# Patient Record
Sex: MALE | Race: WHITE | Employment: OTHER | URBAN - METROPOLITAN AREA
[De-identification: names, ages, dates, MRNs, and addresses within clinical notes are randomized per-mention and may not be internally consistent; named-entity substitution may affect disease eponyms.]

---

## 2020-10-14 ENCOUNTER — TRANSCRIBE ORDER (OUTPATIENT)
Dept: SCHEDULING | Age: 70
End: 2020-10-14

## 2020-10-14 DIAGNOSIS — M48.061 SPINAL STENOSIS OF LUMBAR REGION WITHOUT NEUROGENIC CLAUDICATION: Primary | ICD-10-CM

## 2021-12-20 ENCOUNTER — OFFICE VISIT (OUTPATIENT)
Dept: ORTHOPEDIC SURGERY | Age: 71
End: 2021-12-20
Payer: MEDICARE

## 2021-12-20 VITALS — HEIGHT: 66 IN | BODY MASS INDEX: 25.71 KG/M2 | WEIGHT: 160 LBS

## 2021-12-20 DIAGNOSIS — S46.211A TRAUMATIC PARTIAL TEAR OF BICEPS TENDON, RIGHT, INITIAL ENCOUNTER: ICD-10-CM

## 2021-12-20 DIAGNOSIS — M79.601 RIGHT ARM PAIN: ICD-10-CM

## 2021-12-20 DIAGNOSIS — M75.101 NONTRAUMATIC TEAR OF RIGHT ROTATOR CUFF, UNSPECIFIED TEAR EXTENT: Primary | ICD-10-CM

## 2021-12-20 DIAGNOSIS — M75.21 BICEPS TENDINITIS ON RIGHT: ICD-10-CM

## 2021-12-20 DIAGNOSIS — M75.101 NONTRAUMATIC TEAR OF RIGHT ROTATOR CUFF, UNSPECIFIED TEAR EXTENT: ICD-10-CM

## 2021-12-20 DIAGNOSIS — M75.41 SHOULDER IMPINGEMENT, RIGHT: ICD-10-CM

## 2021-12-20 DIAGNOSIS — M25.521 RIGHT ELBOW PAIN: ICD-10-CM

## 2021-12-20 DIAGNOSIS — M19.011 ARTHRITIS OF RIGHT GLENOHUMERAL JOINT: ICD-10-CM

## 2021-12-20 DIAGNOSIS — M25.511 ACUTE PAIN OF RIGHT SHOULDER: Primary | ICD-10-CM

## 2021-12-20 PROCEDURE — 3017F COLORECTAL CA SCREEN DOC REV: CPT | Performed by: ORTHOPAEDIC SURGERY

## 2021-12-20 PROCEDURE — G8427 DOCREV CUR MEDS BY ELIG CLIN: HCPCS | Performed by: ORTHOPAEDIC SURGERY

## 2021-12-20 PROCEDURE — G8419 CALC BMI OUT NRM PARAM NOF/U: HCPCS | Performed by: ORTHOPAEDIC SURGERY

## 2021-12-20 PROCEDURE — 99214 OFFICE O/P EST MOD 30 MIN: CPT | Performed by: ORTHOPAEDIC SURGERY

## 2021-12-20 PROCEDURE — G8510 SCR DEP NEG, NO PLAN REQD: HCPCS | Performed by: ORTHOPAEDIC SURGERY

## 2021-12-20 PROCEDURE — G8536 NO DOC ELDER MAL SCRN: HCPCS | Performed by: ORTHOPAEDIC SURGERY

## 2021-12-20 PROCEDURE — 1101F PT FALLS ASSESS-DOCD LE1/YR: CPT | Performed by: ORTHOPAEDIC SURGERY

## 2021-12-20 RX ORDER — ATORVASTATIN CALCIUM 80 MG/1
TABLET, FILM COATED ORAL
COMMUNITY

## 2021-12-20 RX ORDER — ASPIRIN 81 MG/1
TABLET ORAL
COMMUNITY

## 2021-12-20 RX ORDER — LEVOTHYROXINE SODIUM 112 UG/1
TABLET ORAL
COMMUNITY

## 2021-12-20 RX ORDER — GABAPENTIN 100 MG/1
CAPSULE ORAL
COMMUNITY
Start: 2021-08-30 | End: 2022-06-08

## 2021-12-20 NOTE — PROGRESS NOTES
Keyla Cummings (: 1950) is a 66 y.o. male, patient, here for evaluation of the following chief complaint(s):  Shoulder Pain (right) and Arm Pain (right)       HPI:    He began having increased right shoulder and arm pain on 2021 had an injury involving the roof. The patient reports that he thinks his pain is also exercise related. He describes his right shoulder and arm pain is moderate, dull, aching, and intermittent. His discomfort does make it difficult for him to go to sleep and does wake him up from sleep. He states that his pain level is essentially unchanged over the last several weeks. He reports that lifting and lying in bed makes pain worse and rest makes his pain better. The patient was not seen in the emergency room. He does report previous but unrelated surgery. Not on File    Current Outpatient Medications   Medication Sig    gabapentin (NEURONTIN) 100 mg capsule gabapentin 100 mg capsule   100mg daily as needed for pain    aspirin delayed-release 81 mg tablet aspirin 81 mg tablet,delayed release   Take 1 tablet every day by oral route.  atorvastatin (LIPITOR) 80 mg tablet atorvastatin 80 mg tablet   TAKE 1 TABLET EVERY DAY    levothyroxine (SYNTHROID) 112 mcg tablet levothyroxine 112 mcg tablet   TAKE 1 TABLET EVERY DAY    rivaroxaban (Xarelto) 2.5 mg tablet Xarelto 2.5 mg tablet   TAKE 1 TABLET BY MOUTH TWICE DAILY     No current facility-administered medications for this visit. History reviewed. No pertinent past medical history. History reviewed. No pertinent surgical history. History reviewed. No pertinent family history.      Social History     Socioeconomic History    Marital status:      Spouse name: Not on file    Number of children: Not on file    Years of education: Not on file    Highest education level: Not on file   Occupational History    Not on file   Tobacco Use    Smoking status: Never Smoker    Smokeless tobacco: Never Used Substance and Sexual Activity    Alcohol use: Not Currently    Drug use: Never    Sexual activity: Not on file   Other Topics Concern    Not on file   Social History Narrative    Not on file     Social Determinants of Health     Financial Resource Strain:     Difficulty of Paying Living Expenses: Not on file   Food Insecurity:     Worried About Running Out of Food in the Last Year: Not on file    [place] of Food in the Last Year: Not on file   Transportation Needs:     Lack of Transportation (Medical): Not on file    Lack of Transportation (Non-Medical): Not on file   Physical Activity:     Days of Exercise per Week: Not on file    Minutes of Exercise per Session: Not on file   Stress:     Feeling of Stress : Not on file   Social Connections:     Frequency of Communication with Friends and Family: Not on file    Frequency of Social Gatherings with Friends and Family: Not on file    Attends Tenri[employer] Services: Not on file    Active Member of [address] [place] [address] or Organizations: Not on file    Attends Club or Organization Meetings: Not on file    Marital Status: Not on file   Intimate Partner Violence:     Fear of Current or Ex-Partner: Not on file    Emotionally Abused: Not on file    Physically Abused: Not on file    Sexually Abused: Not on file   Housing Stability:     Unable to Pay for Housing in the Last Year: Not on file    Number of [place] in the Last Year: Not on file    Unstable Housing in the Last Year: Not on file       Review of Systems   All other systems reviewed and are negative. Vitals:  Ht 5' 6\" (1.676 m)   Wt 160 lb (72.6 kg)   BMI 25.82 kg/m²    Body mass index is 25.82 kg/m². Ortho Exam     The patient is well-developed and well-nourished. The patient presents today in alert and oriented x3 with a normal mood and affect. The patient stands with a normal weightbearing line and walks with a normal gait. Right shoulder: No shoulder girdle atrophy.   There is no soft tissue swelling, ecchymosis, abrasions, or lacerations. Active range of motion of the shoulder is limited to 130 degrees of forward flexion, 120 degrees of lateral abduction, and 70 degrees of external rotation. Internal rotation is to the SI joint and is painful. Passive range of motion is full with a painful impingement sign and painful Roberts sign. Rotator cuff strength is painful to evaluate and is a 4+/5 with forward flexion and lateral abduction. External rotation strength is maintained. There is no crepitation about the joint. Palpation of the [facility] joint does not reproduce discomfort and there is pain elicited with cross body abduction. Strength of the extremity is 5/5 strength at biceps/triceps/wrist extension and is comparable to the contralateral side. DTRs are intact at +2/4 and are symmetrical.  Cervical range of motion is full with no pain to palpation along the paraspinal musculature medial border of the scapula. Spurling's sign is negative. Right elbow, the patient sits with normal posture. They are tender to palpation over the distal biceps insertion. There is soft tissue swelling in the antecubital fossa. The patient has limited range of motion, and discomfort with resisted flexion and supination. The patient has a negative hook test. They have 5/5 strength distally, and are neurovascularly intact distally. There is no erythema, warmth or skin lesions present. ASSESSMENT/PLAN:      1. Acute pain of right shoulder  -     XR SHOULDER RT AP/LAT MIN 2 V; Future  2. Nontraumatic tear of right rotator cuff, unspecified tear extent  3. Arthritis of right glenohumeral joint  4. Right elbow pain  5. Traumatic partial tear of biceps tendon, right, initial encounter  6. Biceps tendinitis on right  7. Right arm pain  8.  Shoulder impingement, right    XR Results (most recent):  Results from Appointment encounter on 12/20/21    XR ELBOW RT AP/LAT    Narrative  2 view x-rays of the right elbow show no evidence of a fracture or dislocation. There is no evidence of degenerative disease. XR SHOULDER RT AP/LAT MIN 2 V    Narrative  Three-view x-rays of the right shoulder show no evidence of a fracture or dislocation. There is a very small osteophyte of the inferior humeral head and there is some evidence of impingement. There is evidence also of a previous distal clavicle resection. Below is the assessment and plan developed based on review of pertinent history, physical exam, labs, studies, and medications. We discussed the patient's ongoing right shoulder pain and right elbow and arm pain. His signs, symptoms, physical exam, description of his pain, and x-rays for shoulder are consistent with a rotator cuff tear, a humeral head osteophyte, and impingement. There is evidence of a previous distal clavicle resection. His signs, symptoms, physical exam, description of his pain, and x-rays for his right elbow are consistent with biceps tendinitis versus a partial distal biceps tendon tear. The possible treatment options were discussed with the patient and because of the duration of his increased pain, no improvement with multiple modalities of conservative management including an at-home exercise program, his physical exam, description of his pain, x-rays, and his inability to complete daily living activities without significant discomfort we elected to obtain an MRI of both his right shoulder and right elbow to further evaluate the severity of his rotator cuff tear and impingement as well as his biceps tendon tear/tendinitis. The MRI images and results will be used in preoperative planning if and likely when surgical intervention is necessary. The risks and benefits of the MRIs were discussed in detail with the patient and he would like to proceed. We will schedule these at his convenience.   I will see him back after his MRIs are complete to discuss the images, results, and further treatment options. In the interim, I did encourage him to ice when possible, modify his activity level based on his right shoulder and arm pain, and use anti-inflammatory medication when necessary. The patient will also work on range of motion, strengthening, and stretching exercises with an at-home exercise program as pain tolerates. I will see him back as noted above after his right shoulder and right elbow MRIs are complete. **We will obtain 2 MRIs for more information to determine the best treatment plan moving forward and help us prepare for surgical intervention if necessary. **    Return in about 3 weeks (around 1/10/2022) for After his right shoulder and right elbow MRI are complete. An electronic signature was used to authenticate this note.   -- Blaine Romero MD

## 2022-01-11 ENCOUNTER — HOSPITAL ENCOUNTER (OUTPATIENT)
Dept: MRI IMAGING | Age: 72
Discharge: HOME | End: 2022-01-11
Attending: ORTHOPAEDIC SURGERY
Payer: MEDICARE

## 2022-01-11 VITALS — WEIGHT: 160 LBS | BODY MASS INDEX: 25.82 KG/M2

## 2022-01-11 DIAGNOSIS — M75.101 NONTRAUMATIC TEAR OF RIGHT ROTATOR CUFF, UNSPECIFIED TEAR EXTENT: ICD-10-CM

## 2022-01-11 DIAGNOSIS — M75.21 BICEPS TENDINITIS ON RIGHT: ICD-10-CM

## 2022-01-11 PROCEDURE — 73221 MRI JOINT UPR EXTREM W/O DYE: CPT

## 2022-01-11 PROCEDURE — A9575 INJ GADOTERATE MEGLUMI 0.1ML: HCPCS | Performed by: RADIOLOGY

## 2022-01-11 PROCEDURE — 74011250636 HC RX REV CODE- 250/636: Performed by: RADIOLOGY

## 2022-01-11 PROCEDURE — 73223 MRI JOINT UPR EXTR W/O&W/DYE: CPT

## 2022-01-11 RX ORDER — GADOTERATE MEGLUMINE 376.9 MG/ML
14 INJECTION INTRAVENOUS
Status: COMPLETED | OUTPATIENT
Start: 2022-01-11 | End: 2022-01-11

## 2022-01-11 RX ADMIN — GADOTERATE MEGLUMINE 14 ML: 376.9 INJECTION INTRAVENOUS at 14:18

## 2022-02-01 ENCOUNTER — OFFICE VISIT (OUTPATIENT)
Dept: ORTHOPEDIC SURGERY | Age: 72
End: 2022-02-01
Payer: MEDICARE

## 2022-02-01 DIAGNOSIS — M75.21 BICEPS TENDINITIS ON RIGHT: ICD-10-CM

## 2022-02-01 DIAGNOSIS — M25.511 CHRONIC RIGHT SHOULDER PAIN: ICD-10-CM

## 2022-02-01 DIAGNOSIS — S46.111D RUPTURE LONG HEAD BICEPS TENDON, RIGHT, SUBSEQUENT ENCOUNTER: ICD-10-CM

## 2022-02-01 DIAGNOSIS — M75.41 SHOULDER IMPINGEMENT, RIGHT: ICD-10-CM

## 2022-02-01 DIAGNOSIS — G89.29 CHRONIC RIGHT SHOULDER PAIN: ICD-10-CM

## 2022-02-01 DIAGNOSIS — M24.821 RIGHT ELBOW JOINT CREPITUS: ICD-10-CM

## 2022-02-01 DIAGNOSIS — M19.011 ARTHRITIS OF RIGHT GLENOHUMERAL JOINT: ICD-10-CM

## 2022-02-01 DIAGNOSIS — M25.511 ACUTE PAIN OF RIGHT SHOULDER: ICD-10-CM

## 2022-02-01 DIAGNOSIS — M75.111 NONTRAUMATIC INCOMPLETE TEAR OF RIGHT ROTATOR CUFF: ICD-10-CM

## 2022-02-01 DIAGNOSIS — M25.521 RIGHT ELBOW PAIN: Primary | ICD-10-CM

## 2022-02-01 PROCEDURE — G8432 DEP SCR NOT DOC, RNG: HCPCS | Performed by: ORTHOPAEDIC SURGERY

## 2022-02-01 PROCEDURE — G8419 CALC BMI OUT NRM PARAM NOF/U: HCPCS | Performed by: ORTHOPAEDIC SURGERY

## 2022-02-01 PROCEDURE — 99214 OFFICE O/P EST MOD 30 MIN: CPT | Performed by: ORTHOPAEDIC SURGERY

## 2022-02-01 PROCEDURE — 3017F COLORECTAL CA SCREEN DOC REV: CPT | Performed by: ORTHOPAEDIC SURGERY

## 2022-02-01 PROCEDURE — 1101F PT FALLS ASSESS-DOCD LE1/YR: CPT | Performed by: ORTHOPAEDIC SURGERY

## 2022-02-01 PROCEDURE — G8536 NO DOC ELDER MAL SCRN: HCPCS | Performed by: ORTHOPAEDIC SURGERY

## 2022-02-01 PROCEDURE — G8427 DOCREV CUR MEDS BY ELIG CLIN: HCPCS | Performed by: ORTHOPAEDIC SURGERY

## 2022-02-01 NOTE — PROGRESS NOTES
Brooklyn Wilson (: 1950) is a 66 y.o. male, patient, here for evaluation of the following chief complaint(s):  No chief complaint on file. HPI:    He was last seen for his right shoulder and right elbow pain on 2021. Since then, the patient did have an MRI performed on his right shoulder and right elbow on 2022. The patient states that his pain levels the same as was his last visit. He rates the severity of his right shoulder and elbow pain is a 4 out of 10. He describes his pain is dull, aching, and intermittent. His right shoulder and elbow pain does make it difficult for him to go to sleep and does wake him up from sleep. The patient has been experiencing some weakness in his right shoulder and elbow. He reports taking no medication for his discomfort. Right elbow MRI results:  Full thickness tear of the distal biceps tendon with approximately 0.5 cm retraction (11-12). Associated marked distal biceps tendinosis, moderate grade distal biceps muscle strain, and moderate bicipital-radial bursitis. Brachialis and triceps tendons are intact. Partial tear at the humeral attachment of the ulnar collateral ligament (9-16) with redemonstrated 5 mm mature osseous fragment in this location (10-16), likely a chronic avulsive fragment. Region of ill-defined edema signal and swelling in the subcutaneous fat posterior to the olecranon (11-13), may reflect bursitis. Moderate grade partial tears at the common flexor and common extensor origins. Right shoulder MRI results: Moderate subscapularis tendinosis and high-grade partial-thickness tears. Complete tear of the intra-articular long head of the biceps tendon. Retraction into the bicipital groove. Moderate supraspinatus tendinosis and shallow partial-thickness tears. Mild infraspinatus tendinosis. IGHL sprain versus adhesive capsulitis. Acromioclavicular osteophytes may cause subacromial impingement.     Not on File    Current Outpatient Medications   Medication Sig    aspirin delayed-release 81 mg tablet aspirin 81 mg tablet,delayed release   Take 1 tablet every day by oral route.  atorvastatin (LIPITOR) 80 mg tablet atorvastatin 80 mg tablet   TAKE 1 TABLET EVERY DAY    levothyroxine (SYNTHROID) 112 mcg tablet levothyroxine 112 mcg tablet   TAKE 1 TABLET EVERY DAY    gabapentin (NEURONTIN) 100 mg capsule gabapentin 100 mg capsule   100mg daily as needed for pain    rivaroxaban (Xarelto) 2.5 mg tablet Xarelto 2.5 mg tablet   TAKE 1 TABLET BY MOUTH TWICE DAILY     No current facility-administered medications for this visit. History reviewed. No pertinent past medical history. History reviewed. No pertinent surgical history. History reviewed. No pertinent family history. Social History     Socioeconomic History    Marital status:      Spouse name: Not on file    Number of children: Not on file    Years of education: Not on file    Highest education level: Not on file   Occupational History    Not on file   Tobacco Use    Smoking status: Never Smoker    Smokeless tobacco: Never Used   Substance and Sexual Activity    Alcohol use: Not Currently    Drug use: Never    Sexual activity: Not on file   Other Topics Concern    Not on file   Social History Narrative    Not on file     Social Determinants of Health     Financial Resource Strain:     Difficulty of Paying Living Expenses: Not on file   Food Insecurity:     Worried About Running Out of Food in the Last Year: Not on file    [place] of Food in the Last Year: Not on file   Transportation Needs:     Lack of Transportation (Medical): Not on file    Lack of Transportation (Non-Medical):  Not on file   Physical Activity:     Days of Exercise per Week: Not on file    Minutes of Exercise per Session: Not on file   Stress:     Feeling of Stress : Not on file   Social Connections:     Frequency of Communication with Friends and Family: Not on file  Frequency of Social Gatherings with Friends and Family: Not on file    Attends Anabaptist Services: Not on file    Active Member of Clubs or Organizations: Not on file    Attends Club or Organization Meetings: Not on file    Marital Status: Not on file   Intimate Partner Violence:     Fear of Current or Ex-Partner: Not on file    Emotionally Abused: Not on file    Physically Abused: Not on file    Sexually Abused: Not on file   Housing Stability:     Unable to Pay for Housing in the Last Year: Not on file    Number of [place] in the Last Year: Not on file    Unstable Housing in the Last Year: Not on file       Review of Systems   All other systems reviewed and are negative. Vitals: There were no vitals taken for this visit. There is no height or weight on file to calculate BMI. Ortho Exam     The patient is well-developed and well-nourished. The patient presents today in alert and oriented x3 with a normal mood and affect. The patient stands with a normal weightbearing line and walks with a normal gait.     Right shoulder: No shoulder girdle atrophy. There is no soft tissue swelling, ecchymosis, abrasions, or lacerations. Active range of motion of the shoulder is limited to 130 degrees of forward flexion, 120 degrees of lateral abduction, and 70 degrees of external rotation. Internal rotation is to the SI joint and is painful. Passive range of motion is full with a painful impingement sign and painful Roberts sign. Rotator cuff strength is painful to evaluate and is a 4+/5 with forward flexion and lateral abduction. External rotation strength is maintained. There is no crepitation about the joint. Palpation of the [facility] joint does not reproduce discomfort and there is pain elicited with cross body abduction. Strength of the extremity is 5/5 strength at biceps/triceps/wrist extension and is comparable to the contralateral side.   DTRs are intact at +2/4 and are symmetrical. Cervical range of motion is full with no pain to palpation along the paraspinal musculature medial border of the scapula. Spurling's sign is negative.     Right elbow, the patient sits with normal posture. They are tender to palpation over the distal biceps insertion. There is soft tissue swelling in the antecubital fossa. The patient has limited range of motion, and discomfort with resisted flexion and supination. The patient has a negative hook test. They have 5/5 strength distally, and are neurovascularly intact distally. There is no erythema, warmth or skin lesions present. ASSESSMENT/PLAN:      1. Right elbow pain  2. Right elbow joint crepitus  3. Acute pain of right shoulder  4. Chronic right shoulder pain  5. Arthritis of right glenohumeral joint  -     REFERRAL TO ORTHOPEDIC SURGERY  6. Shoulder impingement, right  -     REFERRAL TO ORTHOPEDIC SURGERY  7. Nontraumatic incomplete tear of right rotator cuff  -     REFERRAL TO ORTHOPEDIC SURGERY  8. Biceps tendinitis on right  9. Rupture long head biceps tendon, right, subsequent encounter       Below is the assessment and plan developed based on review of pertinent history, physical exam, labs, studies, and medications. We discussed the patient's ongoing right shoulder and elbow pain. We reviewed his MRI images and results today. His right elbow MRI images and results were consistent with a full thickness tear of the distal biceps tendon with approximately 0.5 cm retraction (11-12). Associated marked distal biceps tendinosis, moderate grade distal biceps muscle strain, and moderate bicipital-radial bursitis. Brachialis and triceps tendons are intact. Partial tear at the humeral attachment of the ulnar collateral ligament (9-16) with redemonstrated 5 mm mature osseous fragment in this location (10-16), likely a chronic avulsive fragment.   Region of ill-defined edema signal and swelling in the subcutaneous fat posterior to the olecranon (11-13), may reflect bursitis. Moderate grade partial tears at the common flexor and common extensor origins. His right shoulder MRI results were consistent with moderate subscapularis tendinosis and high-grade partial-thickness tears. Complete tear of the intra-articular long head of the biceps tendon. Retraction into the bicipital groove. Moderate supraspinatus tendinosis and shallow partial-thickness tears. Mild infraspinatus tendinosis. IGHL sprain versus adhesive capsulitis. Acromioclavicular osteophytes may cause subacromial impingement. The possible treatment options were discussed with the patient and we elected to treat his elbow pain conservatively at this point with rest, ice, activity modification, and anti-inflammatory medication. The possible treatment options were discussed with the patient for his right shoulder and because of the duration of his increased pain, no improvement with multiple modalities of conservative management including an at-home exercise program, his physical exam, description of his pain, past x-rays, MRI images and results, and his inability to complete daily living activities without significant discomfort we both decided that surgical intervention was the best treatment plan. The risks and benefits of a right shoulder arthroscopic exam with rotator cuff repair, acromioplasty, distal clavicle resection, and extensive debridement were discussed in detail with the patient and he would like to proceed. We will schedule this at his convenience. In the interim, I did encourage him to ice when possible, modify his activity level based on his right shoulder and right elbow pain, and use anti-inflammatory medication when necessary. The patient will also work on range of motion, strengthening, and stretching exercises with an at-home exercise program as pain tolerates. I will see him back in the near future on the day of his right shoulder surgery.     Return for In the office as needed or on the day of his right shoulder surgery. An electronic signature was used to authenticate this note.   -- Guera Gaston MD

## 2022-02-07 DIAGNOSIS — M19.011 ARTHRITIS OF RIGHT GLENOHUMERAL JOINT: Primary | ICD-10-CM

## 2022-02-07 RX ORDER — OXYCODONE AND ACETAMINOPHEN 5; 325 MG/1; MG/1
1 TABLET ORAL
Qty: 40 TABLET | Refills: 0 | Status: SHIPPED | OUTPATIENT
Start: 2022-02-07 | End: 2022-02-14

## 2022-02-17 NOTE — PROGRESS NOTES
Aly Martínez (: 1950) is a 66 y.o. male, patient, here for evaluation of the following chief complaint(s):  Surgical Follow-up and Shoulder Pain (right)       HPI:    He is now 8 days status post right shoulder arthroscopic exam with rotator cuff repair, acromioplasty, and extensive debridement. His surgery was performed on 2022. The patient rates the severity of his postoperative right shoulder pain as a 2 out of 10. He describes his pain is aching and intermittent. His postoperative right shoulder pain does make it difficult for him to go to sleep and does wake him up from sleep. The patient reports taking no medication for his discomfort. Of note, he did present today NOT wearing his immobilizer as instructed. Not on File    Current Outpatient Medications   Medication Sig    aspirin delayed-release 81 mg tablet aspirin 81 mg tablet,delayed release   Take 1 tablet every day by oral route.  atorvastatin (LIPITOR) 80 mg tablet atorvastatin 80 mg tablet   TAKE 1 TABLET EVERY DAY    levothyroxine (SYNTHROID) 112 mcg tablet levothyroxine 112 mcg tablet   TAKE 1 TABLET EVERY DAY    gabapentin (NEURONTIN) 100 mg capsule gabapentin 100 mg capsule   100mg daily as needed for pain    rivaroxaban (Xarelto) 2.5 mg tablet Xarelto 2.5 mg tablet   TAKE 1 TABLET BY MOUTH TWICE DAILY     No current facility-administered medications for this visit. History reviewed. No pertinent past medical history. History reviewed. No pertinent surgical history. History reviewed. No pertinent family history.      Social History     Socioeconomic History    Marital status:      Spouse name: Not on file    Number of children: Not on file    Years of education: Not on file    Highest education level: Not on file   Occupational History    Not on file   Tobacco Use    Smoking status: Never Smoker    Smokeless tobacco: Never Used   Substance and Sexual Activity    Alcohol use: Not Currently    Drug use: Never    Sexual activity: Not on file   Other Topics Concern    Not on file   Social History Narrative    Not on file     Social Determinants of Health     Financial Resource Strain:     Difficulty of Paying Living Expenses: Not on file   Food Insecurity:     Worried About Running Out of Food in the Last Year: Not on file    [place] of Food in the Last Year: Not on file   Transportation Needs:     Lack of Transportation (Medical): Not on file    Lack of Transportation (Non-Medical): Not on file   Physical Activity:     Days of Exercise per Week: Not on file    Minutes of Exercise per Session: Not on file   Stress:     Feeling of Stress : Not on file   Social Connections:     Frequency of Communication with Friends and Family: Not on file    Frequency of Social Gatherings with Friends and Family: Not on file    Attends Mosque Services: Not on file    Active Member of [address] [place] [address] [employer] or Organizations: Not on file    Attends Club or Organization Meetings: Not on file    Marital Status: Not on file   Intimate Partner Violence:     Fear of Current or Ex-Partner: Not on file    Emotionally Abused: Not on file    Physically Abused: Not on file    Sexually Abused: Not on file   Housing Stability:     Unable to Pay for Housing in the Last Year: Not on file    Number of [place] in the Last Year: Not on file    Unstable Housing in the Last Year: Not on file       Review of Systems   All other systems reviewed and are negative. Vitals:  Ht 5' 6\" (1.676 m)   Wt 168 lb (76.2 kg)   BMI 27.12 kg/m²    Body mass index is 27.12 kg/m². Ortho Exam     The patient is well-developed and well-nourished. The patient presents today in alert and oriented x3 with a normal mood and affect. The patient stands with a normal weightbearing line and walks with a normal gait. Right shoulder wounds are clean and neurovascularly intact. There is some ecchymosis but no erythema.   His stitches were removed and his incisions are healing nicely with no sign of irritation or infection and look normal.  He does have good early elbow and wrist range of motion. His shoulder range of motion and strength were not tested today. His sensations are intact and his pulses are 2+. His skin is healing nicely. ASSESSMENT/PLAN:      1. Pain of right shoulder region  2. Status post shoulder surgery       Below is the assessment and plan developed based on review of pertinent history, physical exam, labs, studies, and medications. We discussed the patient's recent right shoulder arthroscopic exam with rotator cuff repair, acromioplasty, and extensive debridement. His surgery was performed on 2/9/2022. He is now 8 days status post surgical intervention. The patient is progressing nicely in the early stages of his recovery and having the appropriate amount of expected postoperative discomfort at this time. We did remove the patient stitches today in the office without complication. His incisions are healing nicely with no sign of irritation or infection and look normal.  He does have good early elbow range of motion. His shoulder range of motion and strength were not tested today. The patient will continue the postoperative protocol by remaining in his postoperative immobilizer as instructed. He did present today not wearing his immobilizer which is not advised and he will start wearing his immobilizer again as instructed. We did remove his abduction pillow today. He will work on passive only range of motion exercises with an at-home exercise program as pain tolerates. He is to avoid any active range of motion at this time. He is also to avoid any lifting with his right upper extremity. I did encourage him to ice when possible, modify his activity level based on his postoperative right shoulder and arm pain, and use anti-inflammatory medication when necessary.   I will see him back in 2 weeks for reevaluation and further discussion of the postoperative protocol    Return in about 2 weeks (around 3/4/2022) for Reevaluation and further discussion of the postop protocol. An electronic signature was used to authenticate this note.   -- Trip Rivera MD

## 2022-02-18 ENCOUNTER — OFFICE VISIT (OUTPATIENT)
Dept: ORTHOPEDIC SURGERY | Age: 72
End: 2022-02-18
Payer: MEDICARE

## 2022-02-18 VITALS — WEIGHT: 168 LBS | HEIGHT: 66 IN | BODY MASS INDEX: 27 KG/M2

## 2022-02-18 DIAGNOSIS — Z98.890 STATUS POST SHOULDER SURGERY: ICD-10-CM

## 2022-02-18 DIAGNOSIS — M25.511 PAIN OF RIGHT SHOULDER REGION: Primary | ICD-10-CM

## 2022-02-18 PROCEDURE — 99024 POSTOP FOLLOW-UP VISIT: CPT | Performed by: ORTHOPAEDIC SURGERY

## 2022-03-11 ENCOUNTER — OFFICE VISIT (OUTPATIENT)
Dept: ORTHOPEDIC SURGERY | Age: 72
End: 2022-03-11
Payer: MEDICARE

## 2022-03-11 DIAGNOSIS — Z98.890 STATUS POST SHOULDER SURGERY: ICD-10-CM

## 2022-03-11 DIAGNOSIS — M25.511 PAIN OF RIGHT SHOULDER REGION: Primary | ICD-10-CM

## 2022-03-11 PROCEDURE — 99024 POSTOP FOLLOW-UP VISIT: CPT | Performed by: ORTHOPAEDIC SURGERY

## 2022-03-11 NOTE — PROGRESS NOTES
Ludivina Leigh (: 1950) is a 66 y.o. male, patient, here for evaluation of the following chief complaint(s):  Surgical Follow-up and Shoulder Pain (right)       HPI:    He is now just over 4 weeks status post right shoulder arthroscopic exam with rotator cuff repair, acromioplasty, and extensive debridement. His surgery was performed on 2022. He was last seen on 2022. The patient states that his pain has improved since his last visit and surgical procedure. He gives no detail or description of his discomfort. The patient has been working on passive range of motion exercises with an at-home exercise program.  He has been taking medication for his pain as needed. The patient has been wearing his postoperative immobilizer as instructed. Not on File    Current Outpatient Medications   Medication Sig    aspirin delayed-release 81 mg tablet aspirin 81 mg tablet,delayed release   Take 1 tablet every day by oral route.  atorvastatin (LIPITOR) 80 mg tablet atorvastatin 80 mg tablet   TAKE 1 TABLET EVERY DAY    levothyroxine (SYNTHROID) 112 mcg tablet levothyroxine 112 mcg tablet   TAKE 1 TABLET EVERY DAY    gabapentin (NEURONTIN) 100 mg capsule gabapentin 100 mg capsule   100mg daily as needed for pain    rivaroxaban (Xarelto) 2.5 mg tablet Xarelto 2.5 mg tablet   TAKE 1 TABLET BY MOUTH TWICE DAILY     No current facility-administered medications for this visit. History reviewed. No pertinent past medical history. History reviewed. No pertinent surgical history. History reviewed. No pertinent family history.      Social History     Socioeconomic History    Marital status:      Spouse name: Not on file    Number of children: Not on file    Years of education: Not on file    Highest education level: Not on file   Occupational History    Not on file   Tobacco Use    Smoking status: Never Smoker    Smokeless tobacco: Never Used   Substance and Sexual Activity    Alcohol use: Not Currently    Drug use: Never    Sexual activity: Not on file   Other Topics Concern    Not on file   Social History Narrative    Not on file     Social Determinants of Health     Financial Resource Strain:     Difficulty of Paying Living Expenses: Not on file   Food Insecurity:     Worried About Running Out of Food in the Last Year: Not on file    [place] of Food in the Last Year: Not on file   Transportation Needs:     Lack of Transportation (Medical): Not on file    Lack of Transportation (Non-Medical): Not on file   Physical Activity:     Days of Exercise per Week: Not on file    Minutes of Exercise per Session: Not on file   Stress:     Feeling of Stress : Not on file   Social Connections:     Frequency of Communication with Friends and Family: Not on file    Frequency of Social Gatherings with Friends and Family: Not on file    Attends Synagogue Services: Not on file    Active Member of [address] [place] [address] or Organizations: Not on file    Attends Club or Organization Meetings: Not on file    Marital Status: Not on file   Intimate Partner Violence:     Fear of Current or Ex-Partner: Not on file    Emotionally Abused: Not on file    Physically Abused: Not on file    Sexually Abused: Not on file   Housing Stability:     Unable to Pay for Housing in the Last Year: Not on file    Number of [place] in the Last Year: Not on file    Unstable Housing in the Last Year: Not on file       Review of Systems   All other systems reviewed and are negative. Vitals: There were no vitals taken for this visit. There is no height or weight on file to calculate BMI. Ortho Exam     The patient is well-developed and well-nourished. The patient presents today in alert and oriented x3 with a normal mood and affect. The patient stands with a normal weightbearing line and walks with a normal gait. Right shoulder wounds are clean and dry neurovascular intact. There is no ecchymosis or erythema. His incisions are well-healed with no sign of irritation or infection and look normal.  He does have full elbow and wrist range of motion. His passive shoulder range of motion continues to improve nicely. He has approximately 150 degrees of passive forward flexion and 100 degrees of passive abduction. Strength was again not tested today. His sensations are intact and his pulses are 2+. His skin is well-healed. He has active range of motion to 120 degrees of forward flexion 90 degrees of abduction despite instructions to avoid active range of motion. ASSESSMENT/PLAN:      1. Pain of right shoulder region  2. Status post shoulder surgery  -     REFERRAL TO PHYSICAL THERAPY       Below is the assessment and plan developed based on review of pertinent history, physical exam, labs, studies, and medications. **At 5 weeks postop, the patient will start attending formal physical therapy. **    We discussed the patient's right shoulder arthroscopic exam with rotator cuff repair, acromioplasty, and extensive debridement. His surgery was performed on 2/9/2022. He is now just over 4 weeks status post surgical intervention. The patient continues to progress nicely in his recovery. His pain is intermittent and well controlled. His passive range of motion continues to improve. His strength was again not tested today. The patient will continue the postoperative protocol by weaning out of his postoperative immobilizer as instructed. He will continue to work on passive only range of motion exercises until he is 5 weeks status post surgical intervention. At 5 weeks postop, the patient will start working on active and active assist range of motion, strengthening, and stretching exercises at both formal physical therapy and with an at-home exercise program as pain tolerates. Until that time, he is to avoid any active range of motion.   He will continue to ice when possible, modify his activity level based on his postoperative right shoulder pain, and use anti-inflammatory medication when necessary. I will see him back in 3 weeks for reevaluation and further discussion of the postoperative protocol. Return in about 3 weeks (around 4/1/2022) for Reevaluation and further discussion of the postop protocol. An electronic signature was used to authenticate this note.   -- Jarett Finley MD

## 2022-04-11 ENCOUNTER — OFFICE VISIT (OUTPATIENT)
Dept: ORTHOPEDIC SURGERY | Age: 72
End: 2022-04-11
Payer: MEDICARE

## 2022-04-11 VITALS — BODY MASS INDEX: 25.71 KG/M2 | WEIGHT: 160 LBS | HEIGHT: 66 IN

## 2022-04-11 DIAGNOSIS — M25.511 PAIN OF RIGHT SHOULDER REGION: Primary | ICD-10-CM

## 2022-04-11 DIAGNOSIS — Z98.890 STATUS POST SHOULDER SURGERY: ICD-10-CM

## 2022-04-11 PROCEDURE — 99024 POSTOP FOLLOW-UP VISIT: CPT | Performed by: ORTHOPAEDIC SURGERY

## 2022-04-11 NOTE — PROGRESS NOTES
Susanne Christian (: 1950) is a 66 y.o. male, patient, here for evaluation of the following chief complaint(s):  Surgical Follow-up and Shoulder Pain (right)       HPI:    He is now approaching 9 weeks status post right shoulder arthroscopic exam with rotator cuff repair, acromioplasty, and extensive debridement.  His surgery was performed on 2022. He was last seen on 3/14/2022. The patient states that his pain is gotten slightly worse since his last visit. He does report that he tweaked his shoulder and felt increased discomfort a couple weeks ago. He describes his postoperative right shoulder pain as stabbing, aching, and intermittent. He reports taking no medication for his discomfort. Not on File    Current Outpatient Medications   Medication Sig    aspirin delayed-release 81 mg tablet aspirin 81 mg tablet,delayed release   Take 1 tablet every day by oral route.  atorvastatin (LIPITOR) 80 mg tablet atorvastatin 80 mg tablet   TAKE 1 TABLET EVERY DAY    levothyroxine (SYNTHROID) 112 mcg tablet levothyroxine 112 mcg tablet   TAKE 1 TABLET EVERY DAY    gabapentin (NEURONTIN) 100 mg capsule gabapentin 100 mg capsule   100mg daily as needed for pain    rivaroxaban (Xarelto) 2.5 mg tablet Xarelto 2.5 mg tablet   TAKE 1 TABLET BY MOUTH TWICE DAILY     No current facility-administered medications for this visit. History reviewed. No pertinent past medical history. History reviewed. No pertinent surgical history. History reviewed. No pertinent family history.      Social History     Socioeconomic History    Marital status:      Spouse name: Not on file    Number of children: Not on file    Years of education: Not on file    Highest education level: Not on file   Occupational History    Not on file   Tobacco Use    Smoking status: Never Smoker    Smokeless tobacco: Never Used   Substance and Sexual Activity    Alcohol use: Not Currently    Drug use: Never    Sexual activity: Not on file   Other Topics Concern    Not on file   Social History Narrative    Not on file     Social Determinants of Health     Financial Resource Strain:     Difficulty of Paying Living Expenses: Not on file   Food Insecurity:     Worried About Running Out of Food in the Last Year: Not on file    [place] of Food in the Last Year: Not on file   Transportation Needs:     Lack of Transportation (Medical): Not on file    Lack of Transportation (Non-Medical): Not on file   Physical Activity:     Days of Exercise per Week: Not on file    Minutes of Exercise per Session: Not on file   Stress:     Feeling of Stress : Not on file   Social Connections:     Frequency of Communication with Friends and Family: Not on file    Frequency of Social Gatherings with Friends and Family: Not on file    Attends Nondenominational Services: Not on file    Active Member of [address] [place] [address] [employer] or Organizations: Not on file    Attends Club or Organization Meetings: Not on file    Marital Status: Not on file   Intimate Partner Violence:     Fear of Current or Ex-Partner: Not on file    Emotionally Abused: Not on file    Physically Abused: Not on file    Sexually Abused: Not on file   Housing Stability:     Unable to Pay for Housing in the Last Year: Not on file    Number of [place] in the Last Year: Not on file    Unstable Housing in the Last Year: Not on file       Review of Systems   All other systems reviewed and are negative. Vitals:  Ht 5' 6\" (1.676 m)   Wt 160 lb (72.6 kg)   BMI 25.82 kg/m²    Body mass index is 25.82 kg/m². Ortho Exam     The patient is well-developed and well-nourished. The patient presents today in alert and oriented x3 with a normal mood and affect. The patient stands with a normal weightbearing line and walks with a normal gait. Right shoulder wounds are clean dry neurovascular intact. There is no ecchymosis or erythema.   His incisions are well-healed with no sign of irritation or infection and look normal.  He does have full elbow and wrist range of motion. The patient has well-maintained shoulder range of motion but does have discomfort over his infraspinatus. He has excellent strength. His sensations are intact his pulses are 2+. His skin is well-healed. ASSESSMENT/PLAN:      1. Pain of right shoulder region  2. Status post shoulder surgery       Below is the assessment and plan developed based on review of pertinent history, physical exam, labs, studies, and medications. **The patient was referred to formal physical therapy. **    We discussed the patient's right shoulder arthroscopic exam with rotator cuff repair, acromioplasty, and extensive debridement.  His surgery was performed on 2/9/2022. He is now approaching 9 weeks status post surgical intervention. The patient continues to progress nicely in his recovery. He does report a recent strain and does have some increased tenderness over his infraspinatus which we will continue to monitor. His range of motion and strength are both well-maintained. The patient will continue the postoperative protocol by working on range of motion, strengthening, and stretching exercises with both formal physical therapy and with an at-home exercise program as pain tolerates. He may continue to slowly increase activities as tolerated and as discussed today in the office. I did encourage him to ice when possible and modify his activity level based on his right shoulder pain. I will see him back in 4 weeks for reevaluation if he continues to have tenderness over his infraspinatus however, if his pain has improved and is well-maintained I will see him back on an as-needed basis. Return in about 4 weeks (around 5/9/2022), or if symptoms worsen or fail to improve. An electronic signature was used to authenticate this note.   -- Juhi Noble MD

## 2022-05-13 ENCOUNTER — OFFICE VISIT (OUTPATIENT)
Dept: ORTHOPEDIC SURGERY | Age: 72
End: 2022-05-13
Payer: MEDICARE

## 2022-05-13 VITALS — WEIGHT: 160 LBS | HEIGHT: 66 IN | BODY MASS INDEX: 25.71 KG/M2

## 2022-05-13 DIAGNOSIS — Z98.890 STATUS POST SHOULDER SURGERY: ICD-10-CM

## 2022-05-13 DIAGNOSIS — M25.511 PAIN OF RIGHT SHOULDER REGION: ICD-10-CM

## 2022-05-13 DIAGNOSIS — M54.12 CERVICAL RADICULOPATHY: Primary | ICD-10-CM

## 2022-05-13 DIAGNOSIS — M50.30 DDD (DEGENERATIVE DISC DISEASE), CERVICAL: ICD-10-CM

## 2022-05-13 PROCEDURE — 99213 OFFICE O/P EST LOW 20 MIN: CPT | Performed by: ORTHOPAEDIC SURGERY

## 2022-05-13 PROCEDURE — G8536 NO DOC ELDER MAL SCRN: HCPCS | Performed by: ORTHOPAEDIC SURGERY

## 2022-05-13 PROCEDURE — 1101F PT FALLS ASSESS-DOCD LE1/YR: CPT | Performed by: ORTHOPAEDIC SURGERY

## 2022-05-13 PROCEDURE — G8419 CALC BMI OUT NRM PARAM NOF/U: HCPCS | Performed by: ORTHOPAEDIC SURGERY

## 2022-05-13 PROCEDURE — G8427 DOCREV CUR MEDS BY ELIG CLIN: HCPCS | Performed by: ORTHOPAEDIC SURGERY

## 2022-05-13 PROCEDURE — G8432 DEP SCR NOT DOC, RNG: HCPCS | Performed by: ORTHOPAEDIC SURGERY

## 2022-05-13 PROCEDURE — 3017F COLORECTAL CA SCREEN DOC REV: CPT | Performed by: ORTHOPAEDIC SURGERY

## 2022-05-13 NOTE — PROGRESS NOTES
Selina Mcmahon (: 1950) is a 66 y.o. male, patient, here for evaluation of the following chief complaint(s):  Surgical Follow-up (right shoulder sx (22)) and Hand Pain (right)       HPI:    He is now approximately 13-1/2 weeks status post right shoulder arthroscopic exam with rotator cuff repair, acromioplasty, and extensive debridement.  His surgery was performed on 2022. The patient was last seen on 2022. The patient states that his pain level has improved since his last visit and surgical procedure. He rates the severity of his postoperative right shoulder pain is a 3 out of 10. The patient does report some increased arm pain and numbness and tingling down into his right hand. He states that this has happened since his last visit. He describes his discomfort as dull, stabbing, and intermittent. The patient has been taking anti-inflammatory and narcotic pain medication for his postoperative discomfort as needed. No Known Allergies    Current Outpatient Medications   Medication Sig    aspirin delayed-release 81 mg tablet aspirin 81 mg tablet,delayed release   Take 1 tablet every day by oral route.  atorvastatin (LIPITOR) 80 mg tablet atorvastatin 80 mg tablet   TAKE 1 TABLET EVERY DAY    levothyroxine (SYNTHROID) 112 mcg tablet levothyroxine 112 mcg tablet   TAKE 1 TABLET EVERY DAY    gabapentin (NEURONTIN) 100 mg capsule gabapentin 100 mg capsule   100mg daily as needed for pain    rivaroxaban (Xarelto) 2.5 mg tablet Xarelto 2.5 mg tablet   TAKE 1 TABLET BY MOUTH TWICE DAILY     No current facility-administered medications for this visit. History reviewed. No pertinent past medical history. History reviewed. No pertinent surgical history. History reviewed. No pertinent family history.      Social History     Socioeconomic History    Marital status:      Spouse name: Not on file    Number of children: Not on file    Years of education: Not on file    Highest education level: Not on file   Occupational History    Not on file   Tobacco Use    Smoking status: Never Smoker    Smokeless tobacco: Never Used   Substance and Sexual Activity    Alcohol use: Not Currently    Drug use: Never    Sexual activity: Not on file   Other Topics Concern    Not on file   Social History Narrative    Not on file     Social Determinants of Health     Financial Resource Strain:     Difficulty of Paying Living Expenses: Not on file   Food Insecurity:     Worried About Running Out of Food in the Last Year: Not on file    [place] of Food in the Last Year: Not on file   Transportation Needs:     Lack of Transportation (Medical): Not on file    Lack of Transportation (Non-Medical): Not on file   Physical Activity:     Days of Exercise per Week: Not on file    Minutes of Exercise per Session: Not on file   Stress:     Feeling of Stress : Not on file   Social Connections:     Frequency of Communication with Friends and Family: Not on file    Frequency of Social Gatherings with Friends and Family: Not on file    Attends [employer] Services: Not on file    Active Member of [address] [place] [address] or Organizations: Not on file    Attends Club or Organization Meetings: Not on file    Marital Status: Not on file   Intimate Partner Violence:     Fear of Current or Ex-Partner: Not on file    Emotionally Abused: Not on file    Physically Abused: Not on file    Sexually Abused: Not on file   Housing Stability:     Unable to Pay for Housing in the Last Year: Not on file    Number of [place] in the Last Year: Not on file    Unstable Housing in the Last Year: Not on file       Review of Systems   All other systems reviewed and are negative. Vitals:  Ht 5' 6\" (1.676 m)   Wt 160 lb (72.6 kg)   BMI 25.82 kg/m²    Body mass index is 25.82 kg/m². Ortho Exam     The patient is well-developed and well-nourished.   The patient presents today in alert and oriented x3 with a normal mood and affect. The patient stands with a normal weightbearing line and walks with a normal gait. Right shoulder wounds are clean and dry neurovascular intact. There is no ecchymosis or erythema. His incisions are well-healed with no sign of irritation or infection and look normal.  He does have full elbow and wrist range of motion. He has essentially regained full shoulder range of motion. His strength is improving nicely. There is still weakness noted compared to normal.  His sensations are intact his pulses are 2+. His skin is normal.  The patient does have some numbness and tingling down into his right hand. Neck has tenderness over the paraspinous musculature. There is diffuse decreased range of motion in all planes. His strength is decreased compared to normal.  There is a positive Spurling sign. There is pain radiating down into his right shoulder and this is causing numbness in his hand and fingers. His sensations are intact his pulses are 2+. His skin is normal.    ASSESSMENT/PLAN:      1. Cervical radiculopathy  -     XR SPINE CERV PA LAT ODONT 3 V MAX; Future  -     CT SPINE CERV WO CONT; Future  -     REFERRAL TO PHYSICAL THERAPY  2. DDD (degenerative disc disease), cervical  -     REFERRAL TO PHYSICAL THERAPY  3. Pain of right shoulder region  -     REFERRAL TO PHYSICAL THERAPY  4. Status post shoulder surgery  -     REFERRAL TO PHYSICAL THERAPY     XR Results (most recent):  Results from Appointment encounter on 05/13/22    XR SPINE CERV PA LAT ODONT 3 V MAX    Narrative  2 view x-rays of his cervical spine shows end-stage degenerative disc disease at C4-5 C6 5 6 and C6-7. There is complete loss of his cervical lordosis and posterior cervical osteophytes at each level. Below is the assessment and plan developed based on review of pertinent history, physical exam, labs, studies, and medications.     **The patient will continue attending formal physical therapy for his postoperative right shoulder pain. He was referred to formal physical therapy today for his cervical spine. **    We discussed the patient's right shoulder arthroscopic exam with rotator cuff repair, acromioplasty, and extensive debridement.  His surgery was performed on 2/9/2022. He is now approximately 13-1/2 weeks status postsurgical intervention. The patient continues to progress nicely in his recovery. His pain is intermittent and well controlled. He has essentially regained full range of motion. His strength continues to improve. The patient will continue the postoperative protocol by working on range of motion, strengthening, and stretching with both formal physical therapy and with an at-home exercise program as pain tolerates. He may continue to increase activities as tolerated and as discussed today in the office. He has essentially no restrictions at this time. I did encourage him to ice when possible, modify his activity level based on his postoperative right shoulder pain, and use anti-inflammatory medication when necessary. I will see him back on an as-needed basis for his right shoulder pain. We also discussed the patient's neck pain and numbness into his right hand and fingers. His signs, symptoms, physical exam, description of his pain, and x-rays are consistent with cervical radiculopathy and multilevel degenerative disc disease at C4-C5 C5-C6 C6-C7. The possible treatment options were discussed with the patient and because of the duration of his increased pain, no improvement with multiple modalities of conservative management, his physical exam, description of his pain and numbness in his fingers, x-rays, and his inability to complete daily living activities without significant discomfort we elected to obtain a CT scan of his cervical spine. We are obtaining a CT scan because the patient does have a pacemaker.   The CT scan images and results will help us determine the most appropriate treatment plan moving forward. The risks and benefits of the CT scan were discussed in detail with the patient and he would like to proceed. The hospital staff will schedule this at his convenience. After his CT scan is complete, the patient will follow-up with Dr. Alonzo Chauhan and his team for further evaluation of his cervical spine. We will refer to their treatment plan in regards to his neck. I will see him back as noted above for his right shoulder pain on an as-needed basis. **We will obtain a CT scan for more information to determine the best treatment plan moving forward and help us prepare for surgical intervention if necessary. **    Return for Follow-up with Dr. Alonzo Chauhan and his team after his cervical spine CT scan is complete. An electronic signature was used to authenticate this note.   -- Laura Shabazz MD

## 2022-05-26 ENCOUNTER — TELEPHONE (OUTPATIENT)
Dept: ORTHOPEDIC SURGERY | Age: 72
End: 2022-05-26

## 2022-06-03 ENCOUNTER — OFFICE VISIT (OUTPATIENT)
Dept: ORTHOPEDIC SURGERY | Age: 72
End: 2022-06-03
Payer: MEDICARE

## 2022-06-03 VITALS — HEIGHT: 66 IN | WEIGHT: 160 LBS | BODY MASS INDEX: 25.71 KG/M2

## 2022-06-03 DIAGNOSIS — M25.511 PAIN OF RIGHT SHOULDER REGION: ICD-10-CM

## 2022-06-03 DIAGNOSIS — M48.02 CERVICAL STENOSIS OF SPINAL CANAL: ICD-10-CM

## 2022-06-03 DIAGNOSIS — M50.90 CERVICAL DISC DISEASE: ICD-10-CM

## 2022-06-03 DIAGNOSIS — M54.12 CERVICAL RADICULOPATHY: Primary | ICD-10-CM

## 2022-06-03 DIAGNOSIS — M50.30 DDD (DEGENERATIVE DISC DISEASE), CERVICAL: ICD-10-CM

## 2022-06-03 PROCEDURE — 99214 OFFICE O/P EST MOD 30 MIN: CPT | Performed by: ORTHOPAEDIC SURGERY

## 2022-06-03 PROCEDURE — 1123F ACP DISCUSS/DSCN MKR DOCD: CPT | Performed by: ORTHOPAEDIC SURGERY

## 2022-06-03 PROCEDURE — 1101F PT FALLS ASSESS-DOCD LE1/YR: CPT | Performed by: ORTHOPAEDIC SURGERY

## 2022-06-03 PROCEDURE — G8427 DOCREV CUR MEDS BY ELIG CLIN: HCPCS | Performed by: ORTHOPAEDIC SURGERY

## 2022-06-03 PROCEDURE — G8432 DEP SCR NOT DOC, RNG: HCPCS | Performed by: ORTHOPAEDIC SURGERY

## 2022-06-03 PROCEDURE — G8419 CALC BMI OUT NRM PARAM NOF/U: HCPCS | Performed by: ORTHOPAEDIC SURGERY

## 2022-06-03 PROCEDURE — 3017F COLORECTAL CA SCREEN DOC REV: CPT | Performed by: ORTHOPAEDIC SURGERY

## 2022-06-03 PROCEDURE — G8536 NO DOC ELDER MAL SCRN: HCPCS | Performed by: ORTHOPAEDIC SURGERY

## 2022-06-03 NOTE — LETTER
6/3/2022    Patient: David Wesley   YOB: 1950   Date of Visit: 6/3/2022     Maria Esther Adamson MD  [address] [facility] [address]  Via Fax: [phone]    Dear Maria Esther Adamson MD,      Thank you for referring Mr. Roby Moran to [facility] for evaluation. My notes for this consultation are attached. If you have questions, please do not hesitate to call me. I look forward to following your patient along with you.       Sincerely,    Les Murray MD

## 2022-06-03 NOTE — PATIENT INSTRUCTIONS
Neck: Exercises  Introduction  Here are some examples of exercises for you to try. The exercises may be suggested for a condition or for rehabilitation. Start each exercise slowly. Ease off the exercises if you start to have pain. You will be told when to start these exercises and which ones will work best for you. How to do the exercises  Neck stretch    1. This stretch works best if you keep your shoulder down as you lean away from it. To help you remember to do this, start by relaxing your shoulders and lightly holding on to your thighs or your chair. 2. Tilt your head toward your shoulder and hold for 15 to 30 seconds. Let the weight of your head stretch your muscles. 3. If you would like a little added stretch, use your hand to gently and steadily pull your head toward your shoulder. For example, keeping your right shoulder down, lean your head to the left. 4. Repeat 2 to 4 times toward each shoulder. Diagonal neck stretch    1. Turn your head slightly toward the direction you will be stretching, and tilt your head diagonally toward your chest and hold for 15 to 30 seconds. 2. If you would like a little added stretch, use your hand to gently and steadily pull your head forward on the diagonal.  3. Repeat 2 to 4 times toward each side. Dorsal glide stretch    The dorsal glide stretches the back of the neck. If you feel pain, do not glide so far back. Some people find this exercise easier to do while lying on their backs with an ice pack on the neck. 1. Sit or stand tall and look straight ahead. 2. Slowly tuck your chin as you glide your head backward over your body  3. Hold for a count of 6, and then relax for up to 10 seconds. 4. Repeat 8 to 12 times. Chest and shoulder stretch    1. Sit or stand tall and glide your head backward as in the dorsal glide stretch. 2. Raise both arms so that your hands are next to your ears.   3. Take a deep breath, and as you breathe out, lower your elbows down and behind your back. You will feel your shoulder blades slide down and together, and at the same time you will feel a stretch across your chest and the front of your shoulders. 4. Hold for about 6 seconds, and then relax for up to 10 seconds. 5. Repeat 8 to 12 times. Strengthening: Hands on head    1. Move your head backward, forward, and side to side against gentle pressure from your hands, holding each position for about 6 seconds. 2. Repeat 8 to 12 times. Follow-up care is a key part of your treatment and safety. Be sure to make and go to all appointments, and call your doctor if you are having problems. It's also a good idea to know your test results and keep a list of the medicines you take. Where can you learn more? Go to http://www.chambers.com/  Enter P975 in the search box to learn more about \"Neck: Exercises. \"  Current as of: July 1, 2021               Content Version: 13.2  © 2006-2022 [employer]. Care instructions adapted under license by [employer] (which disclaims liability or warranty for this information). If you have questions about a medical condition or this instruction, always ask your healthcare professional. [address] any warranty or liability for your use of this information.

## 2022-06-03 NOTE — PROGRESS NOTES
Marjorie Johnson (: 1950) is a 66 y.o. male, patient, here for evaluation of the following chief complaint(s):  Neck Pain (Cervical CT Scan @[facility] 22)       ASSESSMENT/PLAN:    Below is the assessment and plan developed based on review of pertinent history, physical exam, labs, studies, and medications. The patient has worsening neck pain as well as weakness in the upper extremities particular on the right-hand side. He has multilevel cervical spondylosis. She also has stenosis at C4-5 C5-6 and C6-7. He is having worsening signs of myelopathy. We discussed treatment options. I think is a candidate for an ECF from C4-C7. Risk and benefits were discussed with her. The risks and benefits were discussed at length with the patient and the patient has elected to proceed. Indications for surgery include failed conservative treatment. Alternative treatments, risks and the perioperative course were discussed with the patient. All questions were answered. The risks and benefits of the procedure were explained. Benefits include definitive diagnosis, relief of pain, elimination of deformity and improved function. Risks of surgery including bleeding, infection, weakness, numbness, CSF leak, failure to improve symptoms, exacerbation of medical co-morbidities and even death were discussed with the patient. 1. Cervical radiculopathy  -     XR SPINE CERV PA LAT ODONT 3 V MAX; Future  2. Cervical disc disease  3. Cervical stenosis of spinal canal      No follow-ups on file. SUBJECTIVE/OBJECTIVE:  Marjorie Johnson (: 1950) is a 66 y.o. male. Pain Assessment  2022   Location of Pain Neck; Shoulder;Hand   Location Modifiers Right   Severity of Pain 3   Quality of Pain Dull; Sharp   Frequency of Pain Intermittent   Relieving Factors NSAID   Result of Injury -        He comes today for chronic neck pain and right arm weakness.   He has had this for the last couple months with increasing pain with activities. He is lost dexterity particularly on the right hand. He also has weakness with  strength. He is dropping things. He is also having increasing pain with any type of activity than normal light activities. Imaging:    XR Results (most recent):  Results from Appointment encounter on 06/03/22    XR SPINE CERV PA LAT ODONT 3 V MAX    Narrative  AP and lateral and flexion-extension views were reviewed. He has multilevel cervical spondylosis which is rather severe at C4-5 C5-6 and C6-7. Anterolisthesis C4-5 and C5-6. No other fracture or lytic lesions. MRI Results (most recent):    I independently reviewed his CT scan. He has multilevel cervical spondylosis particularly at C4-5, C5-6 and C6-7. He has near stenosis at C4-5 and C5-6 moderate stenosis C6-7. No Known Allergies    Current Outpatient Medications   Medication Sig    aspirin delayed-release 81 mg tablet aspirin 81 mg tablet,delayed release   Take 1 tablet every day by oral route.  atorvastatin (LIPITOR) 80 mg tablet atorvastatin 80 mg tablet   TAKE 1 TABLET EVERY DAY    levothyroxine (SYNTHROID) 112 mcg tablet levothyroxine 112 mcg tablet   TAKE 1 TABLET EVERY DAY    gabapentin (NEURONTIN) 100 mg capsule gabapentin 100 mg capsule   100mg daily as needed for pain    rivaroxaban (Xarelto) 2.5 mg tablet Xarelto 2.5 mg tablet   TAKE 1 TABLET BY MOUTH TWICE DAILY     No current facility-administered medications for this visit. History reviewed. No pertinent past medical history. History reviewed. No pertinent surgical history. History reviewed. No pertinent family history. Social History     Tobacco Use    Smoking status: Never Smoker    Smokeless tobacco: Never Used   Substance Use Topics    Alcohol use: Not Currently    Drug use: Never        Review of Systems       Vitals:  Ht 5' 6\" (1.676 m)   Wt 160 lb (72.6 kg)   BMI 25.82 kg/m²    Body mass index is 25.82 kg/m².     Ortho Exam Alert and [place]  x 3    Normal gait and station; normal posture    No assistive devices today. Lumbar spine:  Examination of the lumbar spine demonstrates no tenderness on palpation, no pain, no swelling or edema, with normal lumbar range of motion. Thoracic spine: Examination of the thoracic spine demonstrates no tenderness on palpation, no pain, no swelling or edema. Normal sensation and range of motion. Cervical spine:     Examination of the cervical spine demonstrates on inspection: No abnormal cutaneous markings. Mild straightening cervical ptosis. No shoulder asymmetry. .    On palpation: Noted tenderness along the right posterior scapular posterior shoulder blade region. Range of motion: Good range of motion maintained with no guarding. Motor examination:  Right deltoid 5/5,  left deltoid 5/5, right bicep 5/5, left bicep 5/5, right wrist extensor 5/5, left wrist extensor 5/5, right biceps 5/5, left triceps 5/5, right intrinsics 5/5, left intrinsics    Sensory examination: Reveals no deficits. Reflexes: Left bicep 2/2, left triceps 2/2, right biceps 2/2, left triceps 2/2    Functional testing: Spurling's exam is positive on the right; upper limb tension test is positive on the right    Stinson's signs negative bilaterally. An electronic signature was used to authenticate this note.   -- Les Murray MD

## 2022-06-03 NOTE — PROGRESS NOTES
1. Have you been to the ER, urgent care clinic since your last visit? Hospitalized since your last visit? No    2. Have you seen or consulted any other health care providers outside of the [address] [place] [address] since your last visit? Include any pap smears or colon screening.  No    Chief Complaint   Patient presents with    Neck Pain     Cervical CT Scan @[facility] 5/18/22

## 2022-06-08 ENCOUNTER — HOSPITAL ENCOUNTER (OUTPATIENT)
Dept: PREADMISSION TESTING | Age: 72
Discharge: HOME | End: 2022-06-08
Payer: MEDICARE

## 2022-06-08 VITALS
TEMPERATURE: 97.9 F | RESPIRATION RATE: 18 BRPM | HEIGHT: 66 IN | DIASTOLIC BLOOD PRESSURE: 53 MMHG | HEART RATE: 80 BPM | SYSTOLIC BLOOD PRESSURE: 133 MMHG | WEIGHT: 165.57 LBS | BODY MASS INDEX: 26.61 KG/M2

## 2022-06-08 DIAGNOSIS — M48.02 CERVICAL SPINAL STENOSIS: Primary | ICD-10-CM

## 2022-06-08 LAB
ABO + RH BLD: NORMAL
ANION GAP SERPL CALC-SCNC: 6 MMOL/L (ref 5–15)
APPEARANCE UR: CLEAR
ATRIAL RATE: 65 BPM
BACTERIA URNS QL MICRO: NEGATIVE /HPF
BILIRUB UR QL: NEGATIVE
BLOOD GROUP ANTIBODIES SERPL: NORMAL
BUN SERPL-MCNC: 16 MG/DL (ref 6–20)
BUN/CREAT SERPL: 16 (ref 12–20)
CALCIUM SERPL-MCNC: 8.8 MG/DL (ref 8.5–10.1)
CALCULATED R AXIS, ECG10: -68 DEGREES
CALCULATED T AXIS, ECG11: 73 DEGREES
CHLORIDE SERPL-SCNC: 111 MMOL/L (ref 97–108)
CO2 SERPL-SCNC: 24 MMOL/L (ref 21–32)
COLOR UR: NORMAL
CREAT SERPL-MCNC: 0.97 MG/DL (ref 0.7–1.3)
DIAGNOSIS, 93000: NORMAL
EPITH CASTS URNS QL MICRO: NORMAL /LPF
ERYTHROCYTE [DISTWIDTH] IN BLOOD BY AUTOMATED COUNT: 16.6 % (ref 11.5–14.5)
EST. AVERAGE GLUCOSE BLD GHB EST-MCNC: 126 MG/DL
GLUCOSE SERPL-MCNC: 98 MG/DL (ref 65–100)
GLUCOSE UR STRIP.AUTO-MCNC: NEGATIVE MG/DL
HBA1C MFR BLD: 6 % (ref 4–5.6)
HCT VFR BLD AUTO: 38.8 % (ref 36.6–50.3)
HGB BLD-MCNC: 12.1 G/DL (ref 12.1–17)
HGB UR QL STRIP: NEGATIVE
HYALINE CASTS URNS QL MICRO: NORMAL /LPF (ref 0–5)
INR PPP: 1 (ref 0.9–1.1)
KETONES UR QL STRIP.AUTO: NEGATIVE MG/DL
LEUKOCYTE ESTERASE UR QL STRIP.AUTO: NEGATIVE
MCH RBC QN AUTO: 27.4 PG (ref 26–34)
MCHC RBC AUTO-ENTMCNC: 31.2 G/DL (ref 30–36.5)
MCV RBC AUTO: 87.8 FL (ref 80–99)
NITRITE UR QL STRIP.AUTO: NEGATIVE
NRBC # BLD: 0 K/UL (ref 0–0.01)
NRBC BLD-RTO: 0 PER 100 WBC
P-R INTERVAL, ECG05: 228 MS
PH UR STRIP: 5 [PH] (ref 5–8)
PLATELET # BLD AUTO: 187 K/UL (ref 150–400)
POTASSIUM SERPL-SCNC: 4.2 MMOL/L (ref 3.5–5.1)
PROT UR STRIP-MCNC: NEGATIVE MG/DL
PROTHROMBIN TIME: 10.6 SEC (ref 9–11.1)
Q-T INTERVAL, ECG07: 468 MS
QRS DURATION, ECG06: 188 MS
QTC CALCULATION (BEZET), ECG08: 482 MS
RBC # BLD AUTO: 4.42 M/UL (ref 4.1–5.7)
RBC #/AREA URNS HPF: NORMAL /HPF (ref 0–5)
SODIUM SERPL-SCNC: 141 MMOL/L (ref 136–145)
SP GR UR REFRACTOMETRY: 1.01 (ref 1–1.03)
SPECIMEN EXP DATE BLD: NORMAL
UA: UC IF INDICATED,UAUC: NORMAL
UROBILINOGEN UR QL STRIP.AUTO: 0.2 EU/DL (ref 0.2–1)
VENTRICULAR RATE, ECG03: 64 BPM
WBC # BLD AUTO: 5.7 K/UL (ref 4.1–11.1)
WBC URNS QL MICRO: NORMAL /HPF (ref 0–4)

## 2022-06-08 PROCEDURE — 83036 HEMOGLOBIN GLYCOSYLATED A1C: CPT

## 2022-06-08 PROCEDURE — 81001 URINALYSIS AUTO W/SCOPE: CPT

## 2022-06-08 PROCEDURE — 85610 PROTHROMBIN TIME: CPT

## 2022-06-08 PROCEDURE — 85027 COMPLETE CBC AUTOMATED: CPT

## 2022-06-08 PROCEDURE — 86900 BLOOD TYPING SEROLOGIC ABO: CPT

## 2022-06-08 PROCEDURE — 93005 ELECTROCARDIOGRAM TRACING: CPT

## 2022-06-08 PROCEDURE — 80048 BASIC METABOLIC PNL TOTAL CA: CPT

## 2022-06-08 RX ORDER — DIAPER,BRIEF,ADULT, DISPOSABLE
500 EACH MISCELLANEOUS
COMMUNITY

## 2022-06-08 RX ORDER — PANTOPRAZOLE SODIUM 20 MG/1
20 TABLET, DELAYED RELEASE ORAL
COMMUNITY

## 2022-06-08 RX ORDER — ASCORBIC ACID 500 MG
500 TABLET ORAL DAILY
COMMUNITY

## 2022-06-08 RX ORDER — LISINOPRIL 2.5 MG/1
2.5 TABLET ORAL
COMMUNITY

## 2022-06-08 RX ORDER — GLUCOSAMINE SULFATE 1500 MG
1000 POWDER IN PACKET (EA) ORAL
COMMUNITY

## 2022-06-08 NOTE — H&P
Reema Beth (: 1950) is a 66 y.o. male, patient, here for evaluation of the following chief complaint(s):  Neck Pain (Cervical CT Scan [facility])        ASSESSMENT/PLAN:     Below is the assessment and plan developed based on review of pertinent history, physical exam, labs, studies, and medications.     The patient has worsening neck pain as well as weakness in the upper extremities particular on the right-hand side. He has multilevel cervical spondylosis. She also has stenosis at C4-5 C5-6 and C6-7. He is having worsening signs of myelopathy. We discussed treatment options. I think is a candidate for an ACDF from C4-C7. Risk and benefits were discussed with her.     The risks and benefits were discussed at length with the patient and the patient has elected to proceed. Indications for surgery include failed conservative treatment. Alternative treatments, risks and the perioperative course were discussed with the patient. All questions were answered. The risks and benefits of the procedure were explained. Benefits include definitive diagnosis, relief of pain, elimination of deformity and improved function. Risks of surgery including bleeding, infection, weakness, numbness, CSF leak, failure to improve symptoms, exacerbation of medical co-morbidities and even death were discussed with the patient.      1. Cervical radiculopathy  -     XR SPINE CERV PA LAT ODONT 3 V MAX; Future  2. Cervical disc disease  3. Cervical stenosis of spinal canal      Date of Surgery Update:  Reema Beth was seen and examined. History and physical has been reviewed. The patient has been examined.  There have been no significant clinical changes since the completion of the originally dated History and Physical.    Signed By: Shnaa Leon MD     2022 3:24 PM           No follow-ups on file.        SUBJECTIVE/OBJECTIVE:  Reema Beth (: 1950) is a 66 y.o. male.     Pain Assessment 5/13/2022   Location of Pain Neck; Shoulder;Hand   Location Modifiers Right   Severity of Pain 3   Quality of Pain Dull; Sharp   Frequency of Pain Intermittent   Relieving Factors NSAID   Result of Injury -         He comes today for chronic neck pain and right arm weakness. He has had this for the last couple months with increasing pain with activities. He is lost dexterity particularly on the right hand. He also has weakness with  strength. He is dropping things. He is also having increasing pain with any type of activity than normal light activities.     Imaging:     XR Results (most recent):  Results from Appointment encounter on 06/03/22     XR SPINE CERV PA LAT ODONT 3 V MAX     Narrative  AP and lateral and flexion-extension views were reviewed. He has multilevel cervical spondylosis which is rather severe at C4-5 C5-6 and C6-7. Anterolisthesis C4-5 and C5-6. No other fracture or lytic lesions.                       MRI Results (most recent):     I independently reviewed his CT scan. He has multilevel cervical spondylosis particularly at C4-5, C5-6 and C6-7. He has near stenosis at C4-5 and C5-6 moderate stenosis C6-7.        No Known Allergies          Current Outpatient Medications   Medication Sig    aspirin delayed-release 81 mg tablet aspirin 81 mg tablet,delayed release   Take 1 tablet every day by oral route.  atorvastatin (LIPITOR) 80 mg tablet atorvastatin 80 mg tablet   TAKE 1 TABLET EVERY DAY    levothyroxine (SYNTHROID) 112 mcg tablet levothyroxine 112 mcg tablet   TAKE 1 TABLET EVERY DAY    gabapentin (NEURONTIN) 100 mg capsule gabapentin 100 mg capsule   100mg daily as needed for pain    rivaroxaban (Xarelto) 2.5 mg tablet Xarelto 2.5 mg tablet   TAKE 1 TABLET BY MOUTH TWICE DAILY      No current facility-administered medications for this visit.         History reviewed. No pertinent past medical history.      History reviewed.  No pertinent surgical history.     History reviewed. No pertinent family history.      Social History           Tobacco Use    Smoking status: Never Smoker    Smokeless tobacco: Never Used   Substance Use Topics    Alcohol use: Not Currently    Drug use: Never         Review of Systems        Vitals:  Ht 5' 6\" (1.676 m)   Wt 160 lb (72.6 kg)   BMI 25.82 kg/m²    Body mass index is 25.82 kg/m².     Ortho Exam      Alert and [place]  x 3     Normal gait and station; normal posture     No assistive devices today.     Lumbar spine:  Examination of the lumbar spine demonstrates no tenderness on palpation, no pain, no swelling or edema, with normal lumbar range of motion.     Thoracic spine: Examination of the thoracic spine demonstrates no tenderness on palpation, no pain, no swelling or edema. Normal sensation and range of motion. Cervical spine:      Examination of the cervical spine demonstrates on inspection: No abnormal cutaneous markings. Mild straightening cervical ptosis. No shoulder asymmetry. .     On palpation: Noted tenderness along the right posterior scapular posterior shoulder blade region.     Range of motion: Good range of motion maintained with no guarding.     Motor examination:  Right deltoid 5/5,  left deltoid 5/5, right bicep 5/5, left bicep 5/5, right wrist extensor 5/5, left wrist extensor 5/5, right biceps 5/5, left triceps 5/5, right intrinsics 5/5, left intrinsics     Sensory examination: Reveals no deficits.     Reflexes: Left bicep 2/2, left triceps 2/2, right biceps 2/2, left triceps 2/2     Functional testing: Spurling's exam is positive on the right; upper limb tension test is positive on the right     Stinson's signs negative bilaterally.                  An electronic signature was used to authenticate this note.   -- Ziggy Pham MD

## 2022-06-08 NOTE — PERIOP NOTES
[address]  ORTHOPAEDIC    Surgery Date:   06-    Your surgeon's office or [facility] staff will call you between 4 PM- 8 PM the day before surgery with your arrival time. If your surgery is on a Monday, you will receive a call the preceding Friday. 1. Please report to [facility] Patient Access/Admitting on the 1st floor. Bring your insurance card, photo identification, and any copayment (if applicable). 2. If you are going home the same day of your surgery, you must have a responsible adult to drive you home. You need to have a responsible adult to stay with you the first 24 hours after surgery and you should not drive a car for 24 hours following your surgery. 3. Do NOT eat any solid foods after midnight the night before surgery including candy, mints or gum. You may drink clear liquids from midnight until 1 hour prior to arrival time. You may drink up to 12 ounces at one time every 4 hours. 4. Do NOT drink alcohol or smoke 24 hours before surgery. STOP smoking for 14 days prior as it helps with breathing and healing after surgery. 5. If your arrival time is 3pm or later, you may eat a light breakfast before 8am (toast, bagel-no butter, black coffee, plain tea, fruit juice-no pulp) Please note special instructions, if applicable, below for medications. 6. If you are being admitted to the hospital,please leave personal belongings/luggage in your car until you have an assigned hospital room number. 7. Please wear comfortable clothes. Wear your glasses instead of contacts. We ask that all money, jewelry and valuables be left at home. Wear no make up, particularly mascara, the day of surgery. 8.  All body piercings, rings, and jewelry need to be removed and left at home. Please remove any nail polish or artificial nails from your fingernails. Please wear your hair loose or down. Please no pony-tails, buns, or any metal hair accessories.  If you shower the morning of surgery, please do not apply any lotions or powders afterwards. You may wear deodorant. Do not shave any body area within 24 hours of your surgery. 9. Please follow all instructions to avoid any potential surgical cancellation. 10. Should your physical condition change, (i.e. fever, cold, flu, etc.) please notify your surgeon as soon as possible. 11. It is important to be on time. If a situation occurs where you may be delayed, please call:  [phone] / [phone] on the day of surgery. 12. The Preadmission Testing staff can be reached at [phone]. 13. Special instructions: NONE    Current Outpatient Medications   Medication Sig    cholecalciferol (Vitamin D3) 25 mcg (1,000 unit) cap Take 1,000 Units by mouth every morning.  fish oil-omega-3 fatty acids (Fish OiL) 340-1,000 mg capsule Take  by mouth daily. Takes 2, 400 mg    lysine (L-LYSINE) 500 mg tab tablet Take 500 mg by mouth every morning.  ascorbic acid, vitamin C, (Vitamin C) 500 mg tablet Take 500 mg by mouth daily.  lisinopriL (PRINIVIL, ZESTRIL) 2.5 mg tablet Take 2.5 mg by mouth every morning.  pantoprazole (Protonix) 20 mg tablet Take 20 mg by mouth every morning.  aspirin delayed-release 81 mg tablet aspirin 81 mg tablet,delayed release   Take 1 tablet every day by oral route.  levothyroxine (SYNTHROID) 112 mcg tablet levothyroxine 112 mcg tablet   TAKE 1 TABLET EVERY DAY    rivaroxaban (Xarelto) 2.5 mg tablet Xarelto 2.5 mg tablet   TAKE 1 TABLET BY MOUTH TWICE DAILY    atorvastatin (LIPITOR) 80 mg tablet atorvastatin 80 mg tablet   TAKE 1 TABLET EVERY DAY     No current facility-administered medications for this encounter. 1. YOU MUST ONLY TAKE THESE MEDICATIONS THE MORNING OF SURGERY WITH A SIP OF WATER: PANTOPRAZOLE, ATORVASTATIN,LEVOTHYROXINE  2. MEDICATIONS TO TAKE THE MORNING OF SURGERY ONLY IF NEEDED: NONE  3. HOLD these prescription medications BEFORE Surgery: NONE  4.  Ask your surgeon/prescribing physician about when/if to STOP taking these medications: ADVISED BY SURGEON TO [address] TO SURGERY  5. Stop any non-steroidal anti-inflammatory drugs (i.e. Ibuprofen, Naproxen, Advil, Aleve) 5 days before surgery. You may take Tylenol. STOP all vitamins and herbal supplements 1 week prior to  surgery. 6. If you are currently taking Plavix, Coumadin, or any other blood-thinning/anticoagulant medication contact your prescribing physician for instructions. Preventing Infections Before and After - Your Surgery    IMPORTANT INSTRUCTIONS    You play an important role in your health and preparation for surgery. To reduce the germs on your skin you will need to shower with CHG soap (Chorhexidine gluconate 4%) two times before surgery. CHG soap (Hibiclens, Hex-A-Clens or store brand)   CHG soap will be provided at your Preadmission Testing (PAT) appointment.  If you do not have a PAT appointment before surgery, you may arrange to  CHG soap from our office or purchase CHG soap at a pharmacy, grocery or department store.  You need to purchase TWO 4 ounce bottles to use for your 2 showers. Steps to follow:  1. Wash your hair with your normal shampoo and your body with regular soap and rinse well to remove shampoo and soap from your skin. 2. Wet a clean washcloth and turn off the shower. 3. Put CHG soap on washcloth and apply to your entire body from the neck down. Do not use on your head, face or private parts(genitals). Do not use CHG soap on open sores, wounds or areas of skin irritation. 4. Wash you body gently for 5 minutes. Do not wash your skin too hard. This soap does not create lather. Pay special attention to your underarms and from your belly button to your feet. 5. Turn the shower back on and rinse well to get CHG soap off your body. 6. Pat your skin dry with a clean, dry towel. Do not apply lotions or moisturizer.   7. Put on clean clothes and sleep on fresh bed sheets and do not allow pets to sleep with you. Shower with CHG soap 2 times before your surgery   The evening before your surgery   The morning of your surgery      Tips to help prevent infections after your surgery:  1. Protect your surgical wound from germs:  ? Hand washing is the most important thing you and your caregivers can do to prevent infections. ? Keep your bandage clean and dry! ? Do not touch your surgical wound. 2. Use clean, freshly washed towels and washcloths every time you shower; do not share bath linens with others. 3. Until your surgical wound is healed, wear clothing and sleep on bed linens each day that are clean and freshly washed. 4. Do not allow pets to sleep in your bed with you or touch your surgical wound. 5. Do not smoke - smoking delays wound healing. This may be a good time to stop smoking. 6. If you have diabetes, it is important for you to manage your blood sugar levels properly before your surgery as well as after your surgery. Poorly managed blood sugar levels slow down wound healing and prevent you from healing completely. Prevention of Infection  Testing for Staphylococcus aureus on your skin before surgery    Staphylococcus aureus (staph) is a common bacteria that is found on the body. It normally does not cause infection on healthy skin. Before surgery, you will be tested to see if you have staph by swabbing the inside of your nose. When you have an incision with surgery, the goal is to protect that incision from infection. Removal of the staph bacteria before surgery can decrease the risk of a surgical site infection. If your nose swab is positive for staph you will be called. Your treatment will include 2 steps:   Prescription for Mupirocin ointment to be used in each nostril twice a day for 5 days.  Showering with Chlorhexidine (CHG) liquid soap for 5 days prior to surgery. How to use Mupirocin ointment in your nose  1.   the prescription from your pharmacy. You will receive a large tube of ointment which will be big enough for all of your treatments. You will apply this ointment to each nostril 2 times a day for 5 days. 2. Wash your hands with  gel or soap and water for 20 seconds before using ointment. 3. Place a pea-sized amount of ointment on a cotton Q-tip. 4. Apply ointment just inside of each nostril with the Q-tip. Do not push Q-tip or ointment deep inside you nose. 5. Press your nostrils together and massage for a few seconds. 6. Wash your hands with  gel or soap and water after you are finished. 7. Do not get ointment near your eyes. If it gets into your eyes, rinse them with cool water. 8. If you need to use nasal spray, clean the tip of the bottle with alcohol before use and do not use both at the same time. 9. If you are scheduled for COVID testing during the 5 days, do NOT apply morning dose until after the COVID test has been performed. How to use Chlorhexidine (CHG) 4% liquid soap  1. Purchase an 8 ounce bottle of CHG liquid soap (Chlorhexidine 4%, Hibiclens, Hex-A-Clens or store brand) at a pharmacy or grocery store. 2. Wash your hair with your normal shampoo and your body with regular soap and rinse well to remove shampoo and soap from your skin. 3. Wet a clean washcloth and turn off the shower. 4. Put CHG soap on washcloth and apply to your entire body from the neck down. Do not use on your head, face or private parts(genitals). Do not use CHG soap on open sores, wounds or areas of skin irritation. 5. Wash your body gently for 5 minutes. Do not wash your skin too hard. This soap does not create lather. Pay special attention to your underarms and from your belly button to your feet. 6. Turn the shower back on and rinse well to get CHG soap off your body. 7. Pat your skin dry with a clean, dry towel. Do not apply lotions or moisturizer.   8. Put on clean clothes and sleep on fresh bed sheets the night before surgery. Do not allow pets to sleep with you. Eating and Drinking Before Surgery     You may eat a regular dinner at the usual time on the day before your surgery.  Do NOT eat any solid foods after midnight unless your arrival time at the hospital is 3pm or later.  You may drink clear liquids only from 12 midnight until 1 hours prior to your arrival time at the hospital on the day of your surgery. Do NOT drink alcohol.  Clear liquids include:  o Water  o Fruit juices without pulp( i.e. apple juice)  o Carbonated beverages  o Black coffee (no cream/milk)  o Tea (no cream/milk)  o Gatorade   You may drink up to 12-16 ounces at one time every 4 hours between the hours of midnight and 1 hour before your arrival time at the hospital. Example- if your arrival time at the hospital is 6am, you may drink 12-16 ounces of clear liquids no later than 5am.   If your arrival time at the hospital is 3pm or later, you may eat a light breakfast before 8am.   A light breakfast includes:  o Toast or bagel (no butter)  o Black coffee (no cream/milk)  o Tea (no cream/milk)  o Fruit juices without pulp ( i.e. apple juice)  o Do NOT eat meat, eggs, vegetables or fruit   If you have any questions, please contact your surgeon's office. Patient Information Regarding COVID Restrictions    Day of Procedure     Please park in the parking deck or any designated visitor parking lot.  Enter the facility through the [facility] the [facility] [place].   On the day of surgery, please provide the cell phone number of the person who will be waiting for you to the Patient Access representative at the time of registration.  Please wear a mask on the day of your procedure.  We are now allowing two designated visitors per stay. Pediatric patients may have 2 designated visitors. These two people may come in with you on the day of your procedure.  No visitors under the age of 13.    The designated visitor must also wear a mask.  Once your procedure and the immediate recovery period is completed, a nurse in the recovery area will contact your designated visitor to inform them of your room number or to otherwise review other pertinent information regarding your care.  Social distancing practices are to be adhered to in waiting areas and the cafeteria. The patient was contacted via phone. He verbalized understanding of all instructions does not  need reinforcement.

## 2022-06-08 NOTE — PERIOP NOTES
PRE-OP INSTRUCTIONS AND MEDICATIONS PRINTED AND REVIEWED WITH PATIENT. TWO BOTTLES OF CHG SOAP GIVEN. COPY OF COVID CARD ON CHART    COPY OF PACEMAKER CARD ON CHART    RECEIVED CARDIO NOTES FROM DR. DANIELLE RIVAS-OFFICE AND PLACED ON CHART/ LOV 03-

## 2022-06-09 LAB
BACTERIA SPEC CULT: NORMAL
BACTERIA SPEC CULT: NORMAL
SERVICE CMNT-IMP: NORMAL

## 2022-06-09 NOTE — PERIOP NOTES
PAT Nurse Practitioner   Pre-Operative Chart Review/Assessment:-ORTHOPEDIC/NEUROSURGICAL SPINE                Patient Name:  Blayne Humphrey                                                           Age:   66 y.o.    :  1950     Today's Date:  6/10/2022     Date of PAT:   2022      Date of Surgery:    2022      Procedure(s):  C4-C7 ACDF     Surgeon:   Dr. Grace Duenas                       PLAN:       1)  PCP: Dr. Camilla Long        2)  Cardiac Clearance: Pt followed by Dr. Caroline Pollack. LOV 3/23/21. Stress test done at that time, report on chart. No new symptoms or changes to current regimen since last visit. PAT EKG: AV Paced. METs >4.       3)  Diabetic Treatment Consult: Not indicated. A1c-6.0       4)  Sleep Apnea evaluation:  Not indicated.  LILIANA Score 3.       5)  Treatment for MRSA/Staph Aureus: Neg       6)  Additional Concerns: Former smoker, HTN, GERD, CAD s/p CABG x 3, AV block s/p PPM([employer]), PAD s/p mult stenting/SFA bypass              Vital Signs:         Vitals:    22 0905   BP: (!) 133/53   Pulse: 80   Resp: 18   Temp: 97.9 °F (36.6 °C)   Weight: 75.1 kg (165 lb 9.1 oz)   Height: 5' 6\" (1.676 m)            ____________________________________________  PAST MEDICAL HISTORY  Past Medical History:   Diagnosis Date    CAD (coronary artery disease)     DJD (degenerative joint disease) of cervical spine     GERD (gastroesophageal reflux disease)     Hypertension     Thyroid disease       ____________________________________________  PAST SURGICAL HISTORY  Past Surgical History:   Procedure Laterality Date    HX COLONOSCOPY      HX HEENT      thyroid lobectomy    HX HEENT      wisdom teeth removal    HX KNEE REPLACEMENT Right 2006    partial knee replacement    HX LUMBAR LAMINECTOMY  2006    HX ORTHOPAEDIC Right     attached muscle    HX ORTHOPAEDIC Right     attached muscle    HX ORTHOPAEDIC Left     attached muscle    HX PACEMAKER      NM CABG, ARTERY-VEIN, THREE  2017    VASCULAR SURGERY PROCEDURE UNLIST Right     stent inserted in leg  x5     ____________________________________________  HOME MEDICATIONS    Current Outpatient Medications   Medication Sig    cholecalciferol (Vitamin D3) 25 mcg (1,000 unit) cap Take 1,000 Units by mouth every morning.  fish oil-omega-3 fatty acids (Fish OiL) 340-1,000 mg capsule Take  by mouth daily. Takes 2, 400 mg    lysine (L-LYSINE) 500 mg tab tablet Take 500 mg by mouth every morning.  ascorbic acid, vitamin C, (Vitamin C) 500 mg tablet Take 500 mg by mouth daily.  lisinopriL (PRINIVIL, ZESTRIL) 2.5 mg tablet Take 2.5 mg by mouth every morning.  pantoprazole (Protonix) 20 mg tablet Take 20 mg by mouth every morning.  aspirin delayed-release 81 mg tablet aspirin 81 mg tablet,delayed release   Take 1 tablet every day by oral route.     levothyroxine (SYNTHROID) 112 mcg tablet levothyroxine 112 mcg tablet   TAKE 1 TABLET EVERY DAY    rivaroxaban (Xarelto) 2.5 mg tablet Xarelto 2.5 mg tablet   TAKE 1 TABLET BY MOUTH TWICE DAILY    atorvastatin (LIPITOR) 80 mg tablet atorvastatin 80 mg tablet   TAKE 1 TABLET EVERY DAY     No current facility-administered medications for this encounter.      ____________________________________________  ALLERGIES  Not on File   ____________________________________________  SOCIAL HISTORY  Social History     Tobacco Use    Smoking status: Former Smoker     Years: 50.00     Quit date: 2017     Years since quittin.4    Smokeless tobacco: Never Used   Substance Use Topics    Alcohol use: Yes     Comment: drink beer monthly      ____________________________________________   Internal Administration   First Dose COVID-19, Dia Castellanos, Primary or Immunocompromised Series, MRNA, PF, 100mcg/0.5mL  2021   Second Dose COVID-19, Dia Castellanos, Primary or Immunocompromised Series, MRNA, PF, 100mcg/0.5mL  2021   ____________________________________________    Labs: Hospital Outpatient Visit on 06/08/2022   Component Date Value Ref Range Status    Sodium 06/08/2022 141  136 - 145 mmol/L Final    Potassium 06/08/2022 4.2  3.5 - 5.1 mmol/L Final    Chloride 06/08/2022 111* 97 - 108 mmol/L Final    CO2 06/08/2022 24  21 - 32 mmol/L Final    Anion gap 06/08/2022 6  5 - 15 mmol/L Final    Glucose 06/08/2022 98  65 - 100 mg/dL Final    BUN 06/08/2022 16  6 - 20 MG/DL Final    Creatinine 06/08/2022 0.97  0.70 - 1.30 MG/DL Final    BUN/Creatinine ratio 06/08/2022 16  12 - 20   Final    GFR est AA 06/08/2022 >60  >60 ml/min/1.73m2 Final    GFR est non-AA 06/08/2022 >60  >60 ml/min/1.73m2 Final    Estimated GFR is calculated using the IDMS-traceable Modification of Diet in Renal Disease (MDRD) Study equation, reported for both  Americans (GFRAA) and non- Americans (GFRNA), and normalized to 1.73m2 body surface area. The physician must decide which value applies to the patient.  Calcium 06/08/2022 8.8  8.5 - 10.1 MG/DL Final    WBC 06/08/2022 5.7  4.1 - 11.1 K/uL Final    RBC 06/08/2022 4.42  4.10 - 5.70 M/uL Final    HGB 06/08/2022 12.1  12.1 - 17.0 g/dL Final    HCT 06/08/2022 38.8  36.6 - 50.3 % Final    MCV 06/08/2022 87.8  80.0 - 99.0 FL Final    MCH 06/08/2022 27.4  26.0 - 34.0 PG Final    MCHC 06/08/2022 31.2  30.0 - 36.5 g/dL Final    RDW 06/08/2022 16.6* 11.5 - 14.5 % Final    PLATELET 55/59/4553 842  150 - 400 K/uL Final    NRBC 06/08/2022 0.0  0  WBC Final    ABSOLUTE NRBC 06/08/2022 0.00  0.00 - 0.01 K/uL Final    Crossmatch Expiration 06/08/2022 06/20/2022,2359   Final    ABO/Rh(D) 06/08/2022 A POSITIVE   Final    Antibody screen 06/08/2022 NEG   Final    INR 06/08/2022 1.0  0.9 - 1.1   Final    A single therapeutic range for Vit K antagonists may not be optimal for all indications - see June, 2008 issue of Chest, American College of Chest Physicians Evidence-Based Clinical Practice Guidelines, 8th Edition.     Prothrombin time 06/08/2022 10.6  9.0 - 11.1 sec Final    Color 06/08/2022 YELLOW/STRAW    Final    Color Reference Range: Straw, Yellow or Dark Yellow    Appearance 06/08/2022 CLEAR  CLEAR   Final    Specific gravity 06/08/2022 1.013  1.003 - 1.030   Final    pH (UA) 06/08/2022 5.0  5.0 - 8.0   Final    Protein 06/08/2022 Negative  NEG mg/dL Final    Glucose 06/08/2022 Negative  NEG mg/dL Final    Ketone 06/08/2022 Negative  NEG mg/dL Final    Bilirubin 06/08/2022 Negative  NEG   Final    Blood 06/08/2022 Negative  NEG   Final    Urobilinogen 06/08/2022 0.2  0.2 - 1.0 EU/dL Final    Nitrites 06/08/2022 Negative  NEG   Final    Leukocyte Esterase 06/08/2022 Negative  NEG   Final    UA:UC IF INDICATED 06/08/2022 CULTURE NOT INDICATED BY UA RESULT  CNI   Final    WBC 06/08/2022 0-4  0 - 4 /hpf Final    RBC 06/08/2022 0-5  0 - 5 /hpf Final    Epithelial cells 06/08/2022 FEW  FEW /lpf Final    Epithelial cell category consists of squamous cells and /or transitional urothelial cells. Renal tubular cells, if present, are separately identified as such.     Bacteria 06/08/2022 Negative  NEG /hpf Final    Hyaline cast 06/08/2022 0-2  0 - 5 /lpf Final    Ventricular Rate 06/08/2022 64  BPM Final    Atrial Rate 06/08/2022 65  BPM Final    P-R Interval 06/08/2022 228  ms Final    QRS Duration 06/08/2022 188  ms Final    Q-T Interval 06/08/2022 468  ms Final    QTC Calculation (Bezet) 06/08/2022 482  ms Final    Calculated R Axis 06/08/2022 -68  degrees Final    Calculated T Axis 06/08/2022 73  degrees Final    Diagnosis 06/08/2022    Final                    Value:AV dual-paced rhythm with prolonged AV conduction  Abnormal ECG  When compared with ECG of 27-FEB-2006 16:56,  AV dual-paced rhythm is now present  Confirmed by Paradise Herron M.D., Augustine Chavez ([id number]) on 6/8/2022 3:40:40 PM      Hemoglobin A1c 06/08/2022 6.0* 4.0 - 5.6 % Final    Comment: NEW METHOD  PLEASE NOTE NEW REFERENCE RANGE  (NOTE)  HbA1C Interpretive Ranges  <5.7              Normal  5.7 - 6.4         Consider Prediabetes  >6.5              Consider Diabetes      Est. average glucose 06/08/2022 126  mg/dL Final    Special Requests: 06/08/2022 NO SPECIAL REQUESTS    Final    Culture result: 06/08/2022 MRSA NOT PRESENT    Final       Skin:     Denies open wounds, cuts, sores, rashes or other areas of concern in PAT assessment.         Guzman Elder, NP

## 2022-06-10 ENCOUNTER — DOCUMENTATION ONLY (OUTPATIENT)
Dept: ORTHOPEDIC SURGERY | Age: 72
End: 2022-06-10

## 2022-06-17 ENCOUNTER — ANESTHESIA (OUTPATIENT)
Dept: SURGERY | Age: 72
DRG: 473 | End: 2022-06-17
Payer: MEDICARE

## 2022-06-17 ENCOUNTER — ANESTHESIA EVENT (OUTPATIENT)
Dept: SURGERY | Age: 72
DRG: 473 | End: 2022-06-17
Payer: MEDICARE

## 2022-06-17 ENCOUNTER — HOSPITAL ENCOUNTER (INPATIENT)
Age: 72
LOS: 1 days | Discharge: HOME | DRG: 473 | End: 2022-06-18
Attending: ORTHOPAEDIC SURGERY | Admitting: ORTHOPAEDIC SURGERY
Payer: MEDICARE

## 2022-06-17 ENCOUNTER — APPOINTMENT (OUTPATIENT)
Dept: GENERAL RADIOLOGY | Age: 72
DRG: 473 | End: 2022-06-17
Attending: ORTHOPAEDIC SURGERY
Payer: MEDICARE

## 2022-06-17 DIAGNOSIS — Z98.1 S/P CERVICAL SPINAL FUSION: Primary | ICD-10-CM

## 2022-06-17 PROBLEM — M48.02 CERVICAL STENOSIS OF SPINE: Status: ACTIVE | Noted: 2022-06-17

## 2022-06-17 PROBLEM — M50.20 CERVICAL DISC HERNIATION: Status: ACTIVE | Noted: 2022-06-17

## 2022-06-17 LAB
GLUCOSE BLD STRIP.AUTO-MCNC: 86 MG/DL (ref 65–117)
SERVICE CMNT-IMP: NORMAL

## 2022-06-17 PROCEDURE — C1713 ANCHOR/SCREW BN/BN,TIS/BN: HCPCS | Performed by: ORTHOPAEDIC SURGERY

## 2022-06-17 PROCEDURE — 74011250636 HC RX REV CODE- 250/636: Performed by: NURSE ANESTHETIST, CERTIFIED REGISTERED

## 2022-06-17 PROCEDURE — 77030038600 HC TU BPLR IRR DISP STRY -B: Performed by: ORTHOPAEDIC SURGERY

## 2022-06-17 PROCEDURE — 74011250636 HC RX REV CODE- 250/636: Performed by: PHYSICIAN ASSISTANT

## 2022-06-17 PROCEDURE — 72020 X-RAY EXAM OF SPINE 1 VIEW: CPT

## 2022-06-17 PROCEDURE — 77030029099 HC BN WAX SSPC -A: Performed by: ORTHOPAEDIC SURGERY

## 2022-06-17 PROCEDURE — 74011250637 HC RX REV CODE- 250/637: Performed by: STUDENT IN AN ORGANIZED HEALTH CARE EDUCATION/TRAINING PROGRAM

## 2022-06-17 PROCEDURE — 77030034475 HC MISC IMPL SPN: Performed by: ORTHOPAEDIC SURGERY

## 2022-06-17 PROCEDURE — 74011250636 HC RX REV CODE- 250/636: Performed by: STUDENT IN AN ORGANIZED HEALTH CARE EDUCATION/TRAINING PROGRAM

## 2022-06-17 PROCEDURE — 74011000250 HC RX REV CODE- 250: Performed by: NURSE ANESTHETIST, CERTIFIED REGISTERED

## 2022-06-17 PROCEDURE — 77030037713 HC CLOSR DEV INCIS ZIP STRY -B: Performed by: ORTHOPAEDIC SURGERY

## 2022-06-17 PROCEDURE — 77030031139 HC SUT VCRL2 J&J -A: Performed by: ORTHOPAEDIC SURGERY

## 2022-06-17 PROCEDURE — 00NW0ZZ RELEASE CERVICAL SPINAL CORD, OPEN APPROACH: ICD-10-PCS | Performed by: ORTHOPAEDIC SURGERY

## 2022-06-17 PROCEDURE — 76210000000 HC OR PH I REC 2 TO 2.5 HR: Performed by: ORTHOPAEDIC SURGERY

## 2022-06-17 PROCEDURE — 77030012893

## 2022-06-17 PROCEDURE — 74011000272 HC RX REV CODE- 272: Performed by: ORTHOPAEDIC SURGERY

## 2022-06-17 PROCEDURE — 65270000032 HC RM SEMIPRIVATE

## 2022-06-17 PROCEDURE — 76010000171 HC OR TIME 2 TO 2.5 HR INTENSV-TIER 1: Performed by: ORTHOPAEDIC SURGERY

## 2022-06-17 PROCEDURE — 4A11X4G MONITORING OF PERIPHERAL NERVOUS ELECTRICAL ACTIVITY, INTRAOPERATIVE, EXTERNAL APPROACH: ICD-10-PCS | Performed by: ORTHOPAEDIC SURGERY

## 2022-06-17 PROCEDURE — 82962 GLUCOSE BLOOD TEST: CPT

## 2022-06-17 PROCEDURE — 77030040922 HC BLNKT HYPOTHRM STRY -A

## 2022-06-17 PROCEDURE — 77030040506 HC DRN WND MDII -A: Performed by: ORTHOPAEDIC SURGERY

## 2022-06-17 PROCEDURE — 01N10ZZ RELEASE CERVICAL NERVE, OPEN APPROACH: ICD-10-PCS | Performed by: ORTHOPAEDIC SURGERY

## 2022-06-17 PROCEDURE — 74011250637 HC RX REV CODE- 250/637: Performed by: ANESTHESIOLOGY

## 2022-06-17 PROCEDURE — 77030033138 HC SUT PGA STRATFX J&J -B: Performed by: ORTHOPAEDIC SURGERY

## 2022-06-17 PROCEDURE — XRG2092 FUSION OF 2 OR MORE CERVICAL VERTEBRAL JOINTS USING NANOTEXTURED SURFACE INTERBODY FUSION DEVICE, OPEN APPROACH, NEW TECHNOLOGY GROUP 2: ICD-10-PCS | Performed by: ORTHOPAEDIC SURGERY

## 2022-06-17 PROCEDURE — 2709999900 HC NON-CHARGEABLE SUPPLY: Performed by: ORTHOPAEDIC SURGERY

## 2022-06-17 PROCEDURE — 0RT30ZZ RESECTION OF CERVICAL VERTEBRAL DISC, OPEN APPROACH: ICD-10-PCS | Performed by: ORTHOPAEDIC SURGERY

## 2022-06-17 PROCEDURE — 74011250636 HC RX REV CODE- 250/636: Performed by: ANESTHESIOLOGY

## 2022-06-17 PROCEDURE — 77030008684 HC TU ET CUF COVD -B: Performed by: ANESTHESIOLOGY

## 2022-06-17 PROCEDURE — 77030003832 HC BIT DRL ATLNTS MEDT -B: Performed by: ORTHOPAEDIC SURGERY

## 2022-06-17 PROCEDURE — 74011000250 HC RX REV CODE- 250: Performed by: STUDENT IN AN ORGANIZED HEALTH CARE EDUCATION/TRAINING PROGRAM

## 2022-06-17 PROCEDURE — 77030014650 HC SEAL MTRX FLOSEL BAXT -C: Performed by: ORTHOPAEDIC SURGERY

## 2022-06-17 PROCEDURE — 77030035236 HC SUT PDS STRATFX BARB J&J -B: Performed by: ORTHOPAEDIC SURGERY

## 2022-06-17 PROCEDURE — 74011000250 HC RX REV CODE- 250: Performed by: PHYSICIAN ASSISTANT

## 2022-06-17 PROCEDURE — 76060000036 HC ANESTHESIA 2.5 TO 3 HR: Performed by: ORTHOPAEDIC SURGERY

## 2022-06-17 PROCEDURE — 77030008477 HC STYL SATN SLP COVD -A: Performed by: ANESTHESIOLOGY

## 2022-06-17 PROCEDURE — 74011000250 HC RX REV CODE- 250: Performed by: ORTHOPAEDIC SURGERY

## 2022-06-17 PROCEDURE — C1889 IMPLANT/INSERT DEVICE, NOC: HCPCS | Performed by: ORTHOPAEDIC SURGERY

## 2022-06-17 PROCEDURE — 77030004391 HC BUR FLUT MEDT -C: Performed by: ORTHOPAEDIC SURGERY

## 2022-06-17 DEVICE — GRAFT BNE SUB SM CANC FRZN MORSELIZED W/ VIABLE CELL: Type: IMPLANTABLE DEVICE | Site: SPINE CERVICAL | Status: FUNCTIONAL

## 2022-06-17 DEVICE — GRAFT HUM TISS 3X4 CM WND COVERING AMNIO MEMBRN VERSASHIELD: Type: IMPLANTABLE DEVICE | Site: SPINE CERVICAL | Status: FUNCTIONAL

## 2022-06-17 DEVICE — SCREW 7713517 ZEVO VAR SD 3.5MM X 17MM
Type: IMPLANTABLE DEVICE | Site: SPINE CERVICAL | Status: FUNCTIONAL
Brand: ZEVO™ ANTERIOR CERVICAL PLATE SYSTEM

## 2022-06-17 DEVICE — I-FACTOR PUTTY, 1.0CC SYRINGE
Type: IMPLANTABLE DEVICE | Site: SPINE CERVICAL | Status: FUNCTIONAL
Brand: I-FACTOR PEPTIDE ENHANCED BONE GRAFT

## 2022-06-17 DEVICE — INTERBODY FUSION DEVICE  6 DEGREE LARGE 7MM
Type: IMPLANTABLE DEVICE | Site: SPINE CERVICAL | Status: FUNCTIONAL
Brand: ENDOSKELETON® TC NANOLOCK® SURFACE TECHNOLOGY

## 2022-06-17 RX ORDER — ONDANSETRON 2 MG/ML
INJECTION INTRAMUSCULAR; INTRAVENOUS AS NEEDED
Status: DISCONTINUED | OUTPATIENT
Start: 2022-06-17 | End: 2022-06-17 | Stop reason: HOSPADM

## 2022-06-17 RX ORDER — POLYETHYLENE GLYCOL 3350 17 G/17G
17 POWDER, FOR SOLUTION ORAL DAILY
Status: DISCONTINUED | OUTPATIENT
Start: 2022-06-18 | End: 2022-06-18 | Stop reason: HOSPADM

## 2022-06-17 RX ORDER — DEXAMETHASONE SODIUM PHOSPHATE 4 MG/ML
10 INJECTION, SOLUTION INTRA-ARTICULAR; INTRALESIONAL; INTRAMUSCULAR; INTRAVENOUS; SOFT TISSUE EVERY 6 HOURS
Status: DISCONTINUED | OUTPATIENT
Start: 2022-06-17 | End: 2022-06-18 | Stop reason: HOSPADM

## 2022-06-17 RX ORDER — ASPIRIN 81 MG/1
81 TABLET ORAL DAILY
Status: DISCONTINUED | OUTPATIENT
Start: 2022-06-18 | End: 2022-06-18 | Stop reason: HOSPADM

## 2022-06-17 RX ORDER — MIDAZOLAM HYDROCHLORIDE 1 MG/ML
0.5 INJECTION, SOLUTION INTRAMUSCULAR; INTRAVENOUS
Status: DISCONTINUED | OUTPATIENT
Start: 2022-06-17 | End: 2022-06-17 | Stop reason: HOSPADM

## 2022-06-17 RX ORDER — OXYCODONE HYDROCHLORIDE 5 MG/1
5-10 TABLET ORAL
Qty: 60 TABLET | Refills: 0 | Status: SHIPPED | OUTPATIENT
Start: 2022-06-17 | End: 2022-06-20

## 2022-06-17 RX ORDER — PROPOFOL 10 MG/ML
INJECTION, EMULSION INTRAVENOUS AS NEEDED
Status: DISCONTINUED | OUTPATIENT
Start: 2022-06-17 | End: 2022-06-17 | Stop reason: HOSPADM

## 2022-06-17 RX ORDER — FENTANYL CITRATE 50 UG/ML
INJECTION, SOLUTION INTRAMUSCULAR; INTRAVENOUS AS NEEDED
Status: DISCONTINUED | OUTPATIENT
Start: 2022-06-17 | End: 2022-06-17 | Stop reason: HOSPADM

## 2022-06-17 RX ORDER — SODIUM CHLORIDE 0.9 % (FLUSH) 0.9 %
5-40 SYRINGE (ML) INJECTION EVERY 8 HOURS
Status: DISCONTINUED | OUTPATIENT
Start: 2022-06-17 | End: 2022-06-17 | Stop reason: HOSPADM

## 2022-06-17 RX ORDER — DEXAMETHASONE SODIUM PHOSPHATE 4 MG/ML
INJECTION, SOLUTION INTRA-ARTICULAR; INTRALESIONAL; INTRAMUSCULAR; INTRAVENOUS; SOFT TISSUE AS NEEDED
Status: DISCONTINUED | OUTPATIENT
Start: 2022-06-17 | End: 2022-06-17 | Stop reason: HOSPADM

## 2022-06-17 RX ORDER — SODIUM CHLORIDE, SODIUM LACTATE, POTASSIUM CHLORIDE, CALCIUM CHLORIDE 600; 310; 30; 20 MG/100ML; MG/100ML; MG/100ML; MG/100ML
75 INJECTION, SOLUTION INTRAVENOUS CONTINUOUS
Status: DISCONTINUED | OUTPATIENT
Start: 2022-06-17 | End: 2022-06-17 | Stop reason: HOSPADM

## 2022-06-17 RX ORDER — LIDOCAINE HYDROCHLORIDE 20 MG/ML
INJECTION, SOLUTION EPIDURAL; INFILTRATION; INTRACAUDAL; PERINEURAL AS NEEDED
Status: DISCONTINUED | OUTPATIENT
Start: 2022-06-17 | End: 2022-06-17 | Stop reason: HOSPADM

## 2022-06-17 RX ORDER — LIDOCAINE HYDROCHLORIDE 10 MG/ML
0.1 INJECTION, SOLUTION EPIDURAL; INFILTRATION; INTRACAUDAL; PERINEURAL AS NEEDED
Status: DISCONTINUED | OUTPATIENT
Start: 2022-06-17 | End: 2022-06-17 | Stop reason: HOSPADM

## 2022-06-17 RX ORDER — MORPHINE SULFATE 2 MG/ML
2 INJECTION, SOLUTION INTRAMUSCULAR; INTRAVENOUS
Status: DISCONTINUED | OUTPATIENT
Start: 2022-06-17 | End: 2022-06-17 | Stop reason: HOSPADM

## 2022-06-17 RX ORDER — OXYCODONE HYDROCHLORIDE 5 MG/1
10 TABLET ORAL
Status: DISCONTINUED | OUTPATIENT
Start: 2022-06-17 | End: 2022-06-18 | Stop reason: HOSPADM

## 2022-06-17 RX ORDER — ATORVASTATIN CALCIUM 40 MG/1
80 TABLET, FILM COATED ORAL
Status: DISCONTINUED | OUTPATIENT
Start: 2022-06-17 | End: 2022-06-18 | Stop reason: HOSPADM

## 2022-06-17 RX ORDER — ACETAMINOPHEN 325 MG/1
650 TABLET ORAL ONCE
Status: COMPLETED | OUTPATIENT
Start: 2022-06-17 | End: 2022-06-17

## 2022-06-17 RX ORDER — NALOXONE HYDROCHLORIDE 0.4 MG/ML
0.4 INJECTION, SOLUTION INTRAMUSCULAR; INTRAVENOUS; SUBCUTANEOUS AS NEEDED
Status: DISCONTINUED | OUTPATIENT
Start: 2022-06-17 | End: 2022-06-18 | Stop reason: HOSPADM

## 2022-06-17 RX ORDER — FENTANYL CITRATE 50 UG/ML
25 INJECTION, SOLUTION INTRAMUSCULAR; INTRAVENOUS
Status: COMPLETED | OUTPATIENT
Start: 2022-06-17 | End: 2022-06-17

## 2022-06-17 RX ORDER — DIPHENHYDRAMINE HYDROCHLORIDE 50 MG/ML
12.5 INJECTION, SOLUTION INTRAMUSCULAR; INTRAVENOUS AS NEEDED
Status: DISCONTINUED | OUTPATIENT
Start: 2022-06-17 | End: 2022-06-17 | Stop reason: HOSPADM

## 2022-06-17 RX ORDER — GLYCOPYRROLATE 0.2 MG/ML
INJECTION INTRAMUSCULAR; INTRAVENOUS AS NEEDED
Status: DISCONTINUED | OUTPATIENT
Start: 2022-06-17 | End: 2022-06-17 | Stop reason: HOSPADM

## 2022-06-17 RX ORDER — MIDAZOLAM HYDROCHLORIDE 1 MG/ML
1 INJECTION, SOLUTION INTRAMUSCULAR; INTRAVENOUS AS NEEDED
Status: DISCONTINUED | OUTPATIENT
Start: 2022-06-17 | End: 2022-06-17 | Stop reason: HOSPADM

## 2022-06-17 RX ORDER — CYCLOBENZAPRINE HCL 10 MG
10 TABLET ORAL
Status: DISCONTINUED | OUTPATIENT
Start: 2022-06-17 | End: 2022-06-18 | Stop reason: HOSPADM

## 2022-06-17 RX ORDER — HYDROMORPHONE HYDROCHLORIDE 1 MG/ML
0.5 INJECTION, SOLUTION INTRAMUSCULAR; INTRAVENOUS; SUBCUTANEOUS
Status: DISCONTINUED | OUTPATIENT
Start: 2022-06-17 | End: 2022-06-18 | Stop reason: HOSPADM

## 2022-06-17 RX ORDER — NALOXONE HYDROCHLORIDE 4 MG/.1ML
SPRAY NASAL
Qty: 1 EACH | Refills: 0 | Status: SHIPPED | OUTPATIENT
Start: 2022-06-17

## 2022-06-17 RX ORDER — SODIUM CHLORIDE 0.9 % (FLUSH) 0.9 %
5-40 SYRINGE (ML) INJECTION AS NEEDED
Status: DISCONTINUED | OUTPATIENT
Start: 2022-06-17 | End: 2022-06-18 | Stop reason: HOSPADM

## 2022-06-17 RX ORDER — SUCCINYLCHOLINE CHLORIDE 20 MG/ML
INJECTION INTRAMUSCULAR; INTRAVENOUS AS NEEDED
Status: DISCONTINUED | OUTPATIENT
Start: 2022-06-17 | End: 2022-06-17 | Stop reason: HOSPADM

## 2022-06-17 RX ORDER — AMOXICILLIN 250 MG
1 CAPSULE ORAL 2 TIMES DAILY
Status: DISCONTINUED | OUTPATIENT
Start: 2022-06-17 | End: 2022-06-18 | Stop reason: HOSPADM

## 2022-06-17 RX ORDER — KETAMINE HYDROCHLORIDE 50 MG/ML
INJECTION, SOLUTION INTRAMUSCULAR; INTRAVENOUS AS NEEDED
Status: DISCONTINUED | OUTPATIENT
Start: 2022-06-17 | End: 2022-06-17 | Stop reason: HOSPADM

## 2022-06-17 RX ORDER — SODIUM CHLORIDE, SODIUM LACTATE, POTASSIUM CHLORIDE, CALCIUM CHLORIDE 600; 310; 30; 20 MG/100ML; MG/100ML; MG/100ML; MG/100ML
125 INJECTION, SOLUTION INTRAVENOUS CONTINUOUS
Status: DISCONTINUED | OUTPATIENT
Start: 2022-06-17 | End: 2022-06-17 | Stop reason: HOSPADM

## 2022-06-17 RX ORDER — ONDANSETRON 2 MG/ML
4 INJECTION INTRAMUSCULAR; INTRAVENOUS
Status: DISCONTINUED | OUTPATIENT
Start: 2022-06-17 | End: 2022-06-18 | Stop reason: HOSPADM

## 2022-06-17 RX ORDER — SODIUM CHLORIDE 9 MG/ML
25 INJECTION, SOLUTION INTRAVENOUS CONTINUOUS
Status: DISCONTINUED | OUTPATIENT
Start: 2022-06-17 | End: 2022-06-17 | Stop reason: HOSPADM

## 2022-06-17 RX ORDER — PANTOPRAZOLE SODIUM 20 MG/1
20 TABLET, DELAYED RELEASE ORAL
Status: DISCONTINUED | OUTPATIENT
Start: 2022-06-18 | End: 2022-06-18 | Stop reason: HOSPADM

## 2022-06-17 RX ORDER — SODIUM CHLORIDE 0.9 % (FLUSH) 0.9 %
5-40 SYRINGE (ML) INJECTION AS NEEDED
Status: DISCONTINUED | OUTPATIENT
Start: 2022-06-17 | End: 2022-06-17 | Stop reason: HOSPADM

## 2022-06-17 RX ORDER — HYDROMORPHONE HYDROCHLORIDE 1 MG/ML
0.2 INJECTION, SOLUTION INTRAMUSCULAR; INTRAVENOUS; SUBCUTANEOUS
Status: DISCONTINUED | OUTPATIENT
Start: 2022-06-17 | End: 2022-06-17 | Stop reason: HOSPADM

## 2022-06-17 RX ORDER — ROCURONIUM BROMIDE 10 MG/ML
INJECTION, SOLUTION INTRAVENOUS AS NEEDED
Status: DISCONTINUED | OUTPATIENT
Start: 2022-06-17 | End: 2022-06-17 | Stop reason: HOSPADM

## 2022-06-17 RX ORDER — DIPHENHYDRAMINE HYDROCHLORIDE 50 MG/ML
12.5 INJECTION, SOLUTION INTRAMUSCULAR; INTRAVENOUS
Status: DISCONTINUED | OUTPATIENT
Start: 2022-06-17 | End: 2022-06-18 | Stop reason: HOSPADM

## 2022-06-17 RX ORDER — LISINOPRIL 5 MG/1
2.5 TABLET ORAL
Status: DISCONTINUED | OUTPATIENT
Start: 2022-06-18 | End: 2022-06-18 | Stop reason: HOSPADM

## 2022-06-17 RX ORDER — NEOSTIGMINE METHYLSULFATE 1 MG/ML
INJECTION, SOLUTION INTRAVENOUS AS NEEDED
Status: DISCONTINUED | OUTPATIENT
Start: 2022-06-17 | End: 2022-06-17 | Stop reason: HOSPADM

## 2022-06-17 RX ORDER — OXYCODONE HYDROCHLORIDE 5 MG/1
5 TABLET ORAL
Status: DISCONTINUED | OUTPATIENT
Start: 2022-06-17 | End: 2022-06-18 | Stop reason: HOSPADM

## 2022-06-17 RX ORDER — ROPIVACAINE HYDROCHLORIDE 5 MG/ML
30 INJECTION, SOLUTION EPIDURAL; INFILTRATION; PERINEURAL ONCE
Status: DISCONTINUED | OUTPATIENT
Start: 2022-06-17 | End: 2022-06-17 | Stop reason: HOSPADM

## 2022-06-17 RX ORDER — FACIAL-BODY WIPES
10 EACH TOPICAL DAILY PRN
Status: DISCONTINUED | OUTPATIENT
Start: 2022-06-19 | End: 2022-06-18 | Stop reason: HOSPADM

## 2022-06-17 RX ORDER — SODIUM CHLORIDE 0.9 % (FLUSH) 0.9 %
5-40 SYRINGE (ML) INJECTION EVERY 8 HOURS
Status: DISCONTINUED | OUTPATIENT
Start: 2022-06-17 | End: 2022-06-18 | Stop reason: HOSPADM

## 2022-06-17 RX ORDER — ASCORBIC ACID 500 MG
500 TABLET ORAL DAILY
Status: DISCONTINUED | OUTPATIENT
Start: 2022-06-18 | End: 2022-06-18 | Stop reason: HOSPADM

## 2022-06-17 RX ORDER — LEVOTHYROXINE SODIUM 112 UG/1
112 TABLET ORAL
Status: DISCONTINUED | OUTPATIENT
Start: 2022-06-18 | End: 2022-06-18 | Stop reason: HOSPADM

## 2022-06-17 RX ORDER — MELATONIN
1000 DAILY
Status: DISCONTINUED | OUTPATIENT
Start: 2022-06-18 | End: 2022-06-18 | Stop reason: HOSPADM

## 2022-06-17 RX ORDER — FENTANYL CITRATE 50 UG/ML
50 INJECTION, SOLUTION INTRAMUSCULAR; INTRAVENOUS AS NEEDED
Status: DISCONTINUED | OUTPATIENT
Start: 2022-06-17 | End: 2022-06-17 | Stop reason: HOSPADM

## 2022-06-17 RX ORDER — ONDANSETRON 2 MG/ML
4 INJECTION INTRAMUSCULAR; INTRAVENOUS AS NEEDED
Status: DISCONTINUED | OUTPATIENT
Start: 2022-06-17 | End: 2022-06-17 | Stop reason: HOSPADM

## 2022-06-17 RX ORDER — ACETAMINOPHEN 500 MG
1000 TABLET ORAL EVERY 6 HOURS
Status: DISCONTINUED | OUTPATIENT
Start: 2022-06-17 | End: 2022-06-18 | Stop reason: HOSPADM

## 2022-06-17 RX ORDER — SODIUM CHLORIDE, SODIUM LACTATE, POTASSIUM CHLORIDE, CALCIUM CHLORIDE 600; 310; 30; 20 MG/100ML; MG/100ML; MG/100ML; MG/100ML
INJECTION, SOLUTION INTRAVENOUS
Status: DISCONTINUED | OUTPATIENT
Start: 2022-06-17 | End: 2022-06-17 | Stop reason: HOSPADM

## 2022-06-17 RX ORDER — SODIUM CHLORIDE 9 MG/ML
125 INJECTION, SOLUTION INTRAVENOUS CONTINUOUS
Status: DISCONTINUED | OUTPATIENT
Start: 2022-06-17 | End: 2022-06-18 | Stop reason: HOSPADM

## 2022-06-17 RX ORDER — MIDAZOLAM HYDROCHLORIDE 1 MG/ML
INJECTION, SOLUTION INTRAMUSCULAR; INTRAVENOUS AS NEEDED
Status: DISCONTINUED | OUTPATIENT
Start: 2022-06-17 | End: 2022-06-17 | Stop reason: HOSPADM

## 2022-06-17 RX ADMIN — ROCURONIUM BROMIDE 5 MG: 10 SOLUTION INTRAVENOUS at 15:47

## 2022-06-17 RX ADMIN — SODIUM CHLORIDE, POTASSIUM CHLORIDE, SODIUM LACTATE AND CALCIUM CHLORIDE: 600; 310; 30; 20 INJECTION, SOLUTION INTRAVENOUS at 15:44

## 2022-06-17 RX ADMIN — ATORVASTATIN CALCIUM 80 MG: 40 TABLET, FILM COATED ORAL at 21:55

## 2022-06-17 RX ADMIN — DEXAMETHASONE SODIUM PHOSPHATE 10 MG: 4 INJECTION, SOLUTION INTRAMUSCULAR; INTRAVENOUS at 22:02

## 2022-06-17 RX ADMIN — PROPOFOL 200 MG: 10 INJECTION, EMULSION INTRAVENOUS at 15:47

## 2022-06-17 RX ADMIN — PHENYLEPHRINE HYDROCHLORIDE 20 MCG/MIN: 10 INJECTION INTRAVENOUS at 16:25

## 2022-06-17 RX ADMIN — FENTANYL CITRATE 25 MCG: 50 INJECTION, SOLUTION INTRAMUSCULAR; INTRAVENOUS at 18:41

## 2022-06-17 RX ADMIN — SODIUM CHLORIDE, PRESERVATIVE FREE 10 ML: 5 INJECTION INTRAVENOUS at 22:04

## 2022-06-17 RX ADMIN — FENTANYL CITRATE 50 MCG: 50 INJECTION, SOLUTION INTRAMUSCULAR; INTRAVENOUS at 16:18

## 2022-06-17 RX ADMIN — MIDAZOLAM 2 MG: 1 INJECTION INTRAMUSCULAR; INTRAVENOUS at 15:44

## 2022-06-17 RX ADMIN — SODIUM CHLORIDE 125 ML/HR: 9 INJECTION, SOLUTION INTRAVENOUS at 19:00

## 2022-06-17 RX ADMIN — HYDROMORPHONE HYDROCHLORIDE 0.2 MG: 1 INJECTION, SOLUTION INTRAMUSCULAR; INTRAVENOUS; SUBCUTANEOUS at 20:05

## 2022-06-17 RX ADMIN — Medication 3 MG: at 17:59

## 2022-06-17 RX ADMIN — SENNOSIDES AND DOCUSATE SODIUM 1 TABLET: 50; 8.6 TABLET ORAL at 21:55

## 2022-06-17 RX ADMIN — SUCCINYLCHOLINE CHLORIDE 120 MG: 20 INJECTION, SOLUTION INTRAMUSCULAR; INTRAVENOUS at 15:47

## 2022-06-17 RX ADMIN — LIDOCAINE HYDROCHLORIDE 80 MG: 20 INJECTION, SOLUTION EPIDURAL; INFILTRATION; INTRACAUDAL; PERINEURAL at 15:47

## 2022-06-17 RX ADMIN — FENTANYL CITRATE 25 MCG: 50 INJECTION, SOLUTION INTRAMUSCULAR; INTRAVENOUS at 18:46

## 2022-06-17 RX ADMIN — ONDANSETRON HYDROCHLORIDE 4 MG: 2 INJECTION, SOLUTION INTRAMUSCULAR; INTRAVENOUS at 17:54

## 2022-06-17 RX ADMIN — ROCURONIUM BROMIDE 20 MG: 10 SOLUTION INTRAVENOUS at 16:18

## 2022-06-17 RX ADMIN — ACETAMINOPHEN 650 MG: 325 TABLET ORAL at 15:12

## 2022-06-17 RX ADMIN — HYDROMORPHONE HYDROCHLORIDE 0.2 MG: 1 INJECTION, SOLUTION INTRAMUSCULAR; INTRAVENOUS; SUBCUTANEOUS at 19:50

## 2022-06-17 RX ADMIN — FENTANYL CITRATE 25 MCG: 50 INJECTION, SOLUTION INTRAMUSCULAR; INTRAVENOUS at 18:59

## 2022-06-17 RX ADMIN — HYDROMORPHONE HYDROCHLORIDE 0.2 MG: 1 INJECTION, SOLUTION INTRAMUSCULAR; INTRAVENOUS; SUBCUTANEOUS at 19:18

## 2022-06-17 RX ADMIN — HYDROMORPHONE HYDROCHLORIDE 0.2 MG: 1 INJECTION, SOLUTION INTRAMUSCULAR; INTRAVENOUS; SUBCUTANEOUS at 19:33

## 2022-06-17 RX ADMIN — OXYCODONE 10 MG: 5 TABLET ORAL at 21:54

## 2022-06-17 RX ADMIN — KETAMINE HYDROCHLORIDE 25 MG: 50 INJECTION, SOLUTION INTRAMUSCULAR; INTRAVENOUS at 15:59

## 2022-06-17 RX ADMIN — FENTANYL CITRATE 50 MCG: 50 INJECTION, SOLUTION INTRAMUSCULAR; INTRAVENOUS at 15:47

## 2022-06-17 RX ADMIN — SODIUM CHLORIDE, POTASSIUM CHLORIDE, SODIUM LACTATE AND CALCIUM CHLORIDE: 600; 310; 30; 20 INJECTION, SOLUTION INTRAVENOUS at 17:30

## 2022-06-17 RX ADMIN — SODIUM CHLORIDE, POTASSIUM CHLORIDE, SODIUM LACTATE AND CALCIUM CHLORIDE 125 ML/HR: 600; 310; 30; 20 INJECTION, SOLUTION INTRAVENOUS at 15:09

## 2022-06-17 RX ADMIN — ACETAMINOPHEN 1000 MG: 500 TABLET ORAL at 22:00

## 2022-06-17 RX ADMIN — FENTANYL CITRATE 25 MCG: 50 INJECTION, SOLUTION INTRAMUSCULAR; INTRAVENOUS at 18:52

## 2022-06-17 RX ADMIN — PROPOFOL 100 MCG/KG/MIN: 10 INJECTION, EMULSION INTRAVENOUS at 15:56

## 2022-06-17 RX ADMIN — DEXAMETHASONE SODIUM PHOSPHATE 8 MG: 4 INJECTION, SOLUTION INTRAMUSCULAR; INTRAVENOUS at 16:04

## 2022-06-17 RX ADMIN — GLYCOPYRROLATE 0.4 MG: 0.2 INJECTION, SOLUTION INTRAMUSCULAR; INTRAVENOUS at 17:59

## 2022-06-17 RX ADMIN — WATER 2 G: 1 INJECTION INTRAMUSCULAR; INTRAVENOUS; SUBCUTANEOUS at 16:00

## 2022-06-17 NOTE — DISCHARGE INSTRUCTIONS
After Hospital Care Plan:  Discharge Instructions Cervical (Neck) Spine Surgery Dr. Soy Reynaga    Patient Name: Sharona Mata    Date of procedure: 6/17/2022  Date of discharge: 6/17/2022    Procedure: Procedure(s):  ANTERIOR CERVICAL DISCECTOMY AND FUSION FROM C4-C7  PCP: Stefania Sellers MD    Follow up appointments   -follow up with Dr. Soy Reynaga in 2 weeks. Call [phone] to make an appointment as soon as you get home from the hospital.    When to call your Orthopaedic Surgeon:  -Difficulty swallowing that is worse than when you left the hospital.  -Signs of infection-if your incision is red; continues to have drainage; drainage has a foul odor or if you have a persistent fever over 101 degrees for 24 hours  -nausea or vomiting, severe headache  -changes in sensation in your arms or legs (numbness, tingling, loss of color)  -increased weakness-greater than before your surgery  -severe pain or pain not relieved by medications  -Signs of a blood clot in your leg-calf pain, tenderness, redness, swelling of lower leg    When to call your Primary Care Physician:  -Concerns about medical conditions such as diabetes, high blood pressure, asthma, congestive heart failure  -Call if blood sugars are elevated, persistent headache or dizziness, coughing or congestion, constipation or diarrhea, burning with urination, abnormal heart rate    When to call 911 and go to the nearest emergency room:  -acute onset of chest pain, shortness of breath, difficulty breathing    Activity  - You are going home a well person, be as active as possible. Your only exercise should be walking. Start with short frequent walks and increase your walking distance each day.  -Limit the amount of time you sit to 20-30 minute intervals. Sitting for prolonged periods of time will be uncomfortable for you following surgery.   - Do NOT lift anything over 5 pounds  -From now on, even when lifting light weight, bend with your knees and not your back.  -Do NOT do any neck exercises until you have been instructed by your doctor  -When you are in bed, you may lay on your back or on either side. Do NOT lie on your stomach    Cervical Collar (Aspen Collar)  -You are required to wear your cervical collar at all times; except when showering. You may remove the collar long enough to change the pads when needed and to change your dressing each day. -Do not bend or twist when your collar is off. It is best to have someone assist you when changing the pads and your dressing to prevent you from bending your neck. - Clean the pads on your neck collar every day by hand washing with a mild soap and water. Pat them dry with a towel and lay out to air dry. Do not use heat to dry the pads. Driving  -You may not drive or return to work until instructed by your physician. However, you may ride in the car for short periods of time. Incision Care  Your incision has been closed with absorbable sutures and steri-strips. This will assist with healing. Steri-strips are to remain on your incision for 2 weeks. A dry dressing (ABD and tape) will be placed over it and should be changed daily, for at least the first several days after your surgery. If you have no incisional drainage, you may leave the incision open to air if you wish, still leaving the steri-strips in place. Please make sure to wash your hands prior to touching your dressing. You may take brief showers but do not run the water directly onto the wound. After your shower, blot your incision dry with a soft towel and replace the dry dressing. Do not allow the tape to come in contact with the steri-strips. Do not rub or apply any lotions or ointments to your incision site. Do not soak or scrub your wound. Your steri-strips will be removed during your two week follow-up appointment.  If you experience drainage leaking from underneath the steri-strips or if it peels off before 2 weeks, please contact your orthopedic surgeons office. Showering  -You may shower in approximately 2 days after your surgery.    -Leave the dressing on during your shower. Do NOT allow the water to run directly onto your dressing. Once you get out of the shower, put on a dry dressing.  -Reminder- Make sure you put clean pads on your collar after your shower.    -Do not take a tub bath. Preventing blood clots  -You have been given T.E.D. stockings to wear. Continue to wear these for 7 days after your discharge. Put them on in the morning and take them off at night.    -They are used to increase your circulation and prevent blood clots from forming in your legs  -T. E.D. stockings can be machine washed, temperature not to exceed 160° F (71°C) and machine dried for 15 to 20 minutes, temperature not to exceed 250° F (121°C). Pain management  -Take pain medication as prescribed; decrease the amount you use as your pain lessens  -Do not wait until you are in extreme pain to take your medication.  -Avoid alcoholic beverages while taking pain medication    Pain Medication Safety  DO:  -Read the Medication Guide   -Take your medicine exactly as prescribed   -Store your medicine away from children and in a safe place   -Flush unused medicine down the toilet   -Call your healthcare provider for medical advice about side effects. You may report side effects to FDA at 2-406-FDA-8083.   -Please be aware that many medications contain Tylenol. We do not want you to over medicate so please read the information below as a guide. Do not take more than 4 Grams of Tylenol in a 24 hour period.   (There are 1000 milligrams in one Gram)                                                                                                                                                                                                    Percocet contains 325 mg of Tylenol per tablet (do not take more than 12 tablets in 24 hours)  Lortab contains 500 mg of Tylenol per tablet (do not take more than 8 tablets in 24 hours)  Norco contains 325 mg of Tylenol per tablet (do not take more than 12 tablets in 24 hours). DO NOT:  -Do not give your medicine to others   -Do not take medicine unless it was prescribed for you   -Do not stop taking your medicine without talking to your healthcare provider   -Do not break, chew, crush, dissolve, or inject your medicine. If you cannot  swallow your medicine whole, talk to your healthcare provider.  -Do not drink alcohol while taking this medicine  -Do not take anti-inflammatory medications or aspirin unless instructed by your     Physician. Diet  -resume usual diet; drink plenty of fluids; eat foods high in fiber  -It is important to have regular bowel movements. Pain medications may cause constipation. You may want to take a stool softener (such as Senokot-S or Colace) to prevent constipation. If constipation occurs, take a laxative (such as Dulcolax tablets, Milk of Magnesia, or a suppository). Laxatives should only be used if the above preventable measures have failed and you still have not had a bowel movement after three days.

## 2022-06-17 NOTE — ANESTHESIA POSTPROCEDURE EVALUATION
Post-Anesthesia Evaluation and Assessment    Patient: Marjorie Johnson MRN: [id number]  SSN: [id number]    YOB: 1950  Age: 66 y.o. Sex: male      I have evaluated the patient and they are stable and ready for discharge from the PACU. Cardiovascular Function/Vital Signs  Visit Vitals  /67   Pulse 65   Temp 36.4 °C (97.5 °F)   Resp 16   Ht 5' 6\" (1.676 m)   Wt 75.1 kg (165 lb 9.1 oz)   SpO2 97%   BMI 26.72 kg/m²       Patient is status post General anesthesia for Procedure(s):  ANTERIOR CERVICAL DISCECTOMY AND FUSION C4-C7. Nausea/Vomiting: None    Postoperative hydration reviewed and adequate. Pain:  Pain Scale 1: Numeric (0 - 10) (06/17/22 1442)  Pain Intensity 1: 0 (06/17/22 1442)   Managed    Neurological Status:   Neuro (WDL): Exceptions to WDL (constant numbness right leg, intermittant numbness/tingling right arm) (06/17/22 1451)   At baseline    Mental Status, Level of Consciousness: Alert and  oriented to person, place, and time    Pulmonary Status:   O2 Device: CO2 nasal cannula;Oral airway (06/17/22 1812)   Adequate oxygenation and airway patent    Complications related to anesthesia: None    Post-anesthesia assessment completed. No concerns    Signed By: Binu Hays MD     June 17, 2022              Procedure(s):  ANTERIOR CERVICAL DISCECTOMY AND FUSION C4-C7. general    <BSHSIANPOST>    INITIAL Post-op Vital signs:   Vitals Value Taken Time   /74 06/17/22 1820   Temp 36.4 °C (97.5 °F) 06/17/22 1812   Pulse 60 06/17/22 1824   Resp 14 06/17/22 1824   SpO2 95 % 06/17/22 1824   Vitals shown include unvalidated device data.

## 2022-06-17 NOTE — ANESTHESIA PREPROCEDURE EVALUATION
Relevant Problems   No relevant active problems       Anesthetic History   No history of anesthetic complications            Review of Systems / Medical History  Patient summary reviewed, nursing notes reviewed and pertinent labs reviewed    Pulmonary  Within defined limits                 Neuro/Psych   Within defined limits           Cardiovascular    Hypertension: well controlled          CAD         GI/Hepatic/Renal     GERD           Endo/Other      Hypothyroidism: well controlled  Arthritis     Other Findings              Physical Exam    Airway  Mallampati: II  TM Distance: > 6 cm  Neck ROM: normal range of motion   Mouth opening: Normal     Cardiovascular  Regular rate and rhythm,  S1 and S2 normal,  no murmur, click, rub, or gallop             Dental  No notable dental hx       Pulmonary  Breath sounds clear to auscultation               Abdominal  GI exam deferred       Other Findings            Anesthetic Plan    ASA: 2  Anesthesia type: general          Induction: Intravenous  Anesthetic plan and risks discussed with: Patient

## 2022-06-17 NOTE — BRIEF OP NOTE
Brief Postoperative Note    Patient: Zeina Covington  YOB: 1950  MRN: [id number]    Date of Procedure: 6/17/2022     Pre-Op Diagnosis: CERVICAL RADICULOPATHY  CERVICAL DISC DISEASE  CERVICAL STENOSIS OF SPINAL CANAL  DDD  PAIN OF RIGHT SHOULDER    Post-Op Diagnosis: Same as preoperative diagnosis. Procedure(s):  ANTERIOR CERVICAL DISCECTOMY AND FUSION C4-C7    Surgeon(s):  Carmita Cardenas MD    Surgical Assistant: Physician Assistant: BRENTON Mayfield    Anesthesia: General     Estimated Blood Loss (mL): Minimal    Complications: None    Specimens: * No specimens in log *     Implants:   Implant Name Type Inv.  Item Serial No.  Lot No. LRB No. Used Action   [facility]   N/A [employer] [id number] N/A 1 Implanted   GRAFT HUM TISS 3X4 CM WND COVERING AMNIO MEMBRN VERSASHIELD - [id number]  GRAFT HUM TISS 3X4 CM WND COVERING AMNIO MEMBRN VERSASHIELD [id number] [employer]_ N/A N/A 1 Implanted   GRAFT BNE SUB SM CANC FRZN MORSELIZED W/ VIABLE CELL - [id number]  GRAFT BNE SUB SM CANC FRZN MORSELIZED W/ VIABLE CELL [id number] [employer]_ N/A N/A 1 Implanted   GRAFT BNE SUB SM CANC FRZN MORSELIZED W/ VIABLE CELL - [id number]  GRAFT BNE SUB SM CANC FRZN MORSELIZED W/ VIABLE CELL [id number] [employer]_ N/A N/A 1 Implanted   CAGE SPNL 6 DEG LG [id number] MM ENDOSKELETON TC [place] - SN/A  CAGE SPNL 6 DEG LG [id number] MM ENDOSKELETON TC [place]OCK N/A [employer] DANEK_WD [id number] N/A 1 Implanted   CAGE SPNL 6 DEG LG [id number] MM ENDOSKELETON TC [place] - SN/A  CAGE SPNL 6 DEG LG [id number] MM ENDOSKELETON TC [place] N/A MEDTR[employer] SOFAMOR DANEK_WD [id number] N/A 1 Implanted   CAGE SPNL 6 DEG LG [id number] MM ENDOSKELETON TC [place] - SN/A  CAGE SPNL 6 DEG LG [id number] MM ENDOSKELETON TC [place] N/A [facility] D[facility]WD [id number] N/A 1 Implanted   PLATE SPNL L53MM LEV 3 ANT CERV TI ZEVO - SN/A  PLATE SPNL [id number] LEV 3 ANT CERV TI ZEVO N/A [employer] SOFAMOR DANEK_WD N/A N/A 1 Implanted   SCREW SPNL L17MM DIA3. 5MM ANT CERV TI SELF DRL CYNDIE ANG - SN/A  SCREW SPNL L17MM DIA3. 5MM ANT CERV TI SELF DRL CYNDIE ANG N/A [employer] SOFAM[employer] DANEK_WD N/A N/A 8 Implanted       Drains: * No LDAs found *    Findings: DDD    Electronically Signed by BRENTON Giordano on 6/17/2022 at 6:04 PM

## 2022-06-17 NOTE — PERIOP NOTES
SURGIFOAM added to the sterile field for use by surgeon    REF: 1972  LOT: [id number]  EXP: 2025-06-24      Floseal Hemostatic Matrix 5mL added to the sterile field for use by surgeon.     REF: [id number]  LOT: [id number]  EXP: 2023-11-30

## 2022-06-17 NOTE — ROUTINE PROCESS
Patient: Radha Urena MRN: [id number]  SSN: [id number]   YOB: 1950  Age: 66 y.o. Sex: male     Patient is status post Procedure(s):  ANTERIOR CERVICAL DISCECTOMY AND FUSION C4-C7. Surgeon(s) and Role: Moises Brito MD - Primary    Local/Dose/Irrigation:  See [facility]                  Peripheral IV 06/17/22 Distal;Posterior;Right Forearm (Active)   Site Assessment Clean, dry, & intact 06/17/22 1452   Phlebitis Assessment 0 06/17/22 1452   Infiltration Assessment 0 06/17/22 1452   Dressing Status Clean, dry, & intact 06/17/22 1452   Dressing Type Transparent;Tape 06/17/22 1452   Hub Color/Line Status Pink; Infusing 06/17/22 1452       Peripheral IV 06/17/22 Anterior;Distal;Right Forearm (Active)   Site Assessment Clean, dry, & intact 06/17/22 1506   Phlebitis Assessment 0 06/17/22 1506   Infiltration Assessment 0 06/17/22 1506   Dressing Status Clean, dry, & intact 06/17/22 1506   Dressing Type Transparent 06/17/22 1506   Hub Color/Line Status Pink 06/17/22 1506   Alcohol Cap Used Yes 06/17/22 1506            Airway - Endotracheal Tube 06/17/22 Oral (Active)                   Dressing/Packing:  Incision 06/17/22 Neck-Dressing/Treatment: Other (Comment) (steristrip, telfa, medipore tape) (06/17/22 1611)    Splint/Cast:  ]    Other:

## 2022-06-18 VITALS
WEIGHT: 165.57 LBS | BODY MASS INDEX: 26.61 KG/M2 | HEIGHT: 66 IN | OXYGEN SATURATION: 93 % | TEMPERATURE: 97.9 F | HEART RATE: 76 BPM | RESPIRATION RATE: 17 BRPM | DIASTOLIC BLOOD PRESSURE: 68 MMHG | SYSTOLIC BLOOD PRESSURE: 149 MMHG

## 2022-06-18 PROCEDURE — 77010033678 HC OXYGEN DAILY

## 2022-06-18 PROCEDURE — 97116 GAIT TRAINING THERAPY: CPT

## 2022-06-18 PROCEDURE — 74011250636 HC RX REV CODE- 250/636: Performed by: STUDENT IN AN ORGANIZED HEALTH CARE EDUCATION/TRAINING PROGRAM

## 2022-06-18 PROCEDURE — 94760 N-INVAS EAR/PLS OXIMETRY 1: CPT

## 2022-06-18 PROCEDURE — 74011000250 HC RX REV CODE- 250: Performed by: STUDENT IN AN ORGANIZED HEALTH CARE EDUCATION/TRAINING PROGRAM

## 2022-06-18 PROCEDURE — 97161 PT EVAL LOW COMPLEX 20 MIN: CPT

## 2022-06-18 PROCEDURE — 74011250637 HC RX REV CODE- 250/637: Performed by: STUDENT IN AN ORGANIZED HEALTH CARE EDUCATION/TRAINING PROGRAM

## 2022-06-18 PROCEDURE — 97165 OT EVAL LOW COMPLEX 30 MIN: CPT

## 2022-06-18 RX ADMIN — RIVAROXABAN 2.5 MG: 2.5 TABLET, FILM COATED ORAL at 08:46

## 2022-06-18 RX ADMIN — DEXAMETHASONE SODIUM PHOSPHATE 10 MG: 4 INJECTION, SOLUTION INTRAMUSCULAR; INTRAVENOUS at 05:09

## 2022-06-18 RX ADMIN — OXYCODONE 5 MG: 5 TABLET ORAL at 07:21

## 2022-06-18 RX ADMIN — DEXAMETHASONE SODIUM PHOSPHATE 10 MG: 4 INJECTION, SOLUTION INTRAMUSCULAR; INTRAVENOUS at 08:42

## 2022-06-18 RX ADMIN — LEVOTHYROXINE SODIUM 112 MCG: 0.11 TABLET ORAL at 07:21

## 2022-06-18 RX ADMIN — SENNOSIDES AND DOCUSATE SODIUM 1 TABLET: 50; 8.6 TABLET ORAL at 08:42

## 2022-06-18 RX ADMIN — WATER 2 G: 1 INJECTION INTRAMUSCULAR; INTRAVENOUS; SUBCUTANEOUS at 00:29

## 2022-06-18 RX ADMIN — LISINOPRIL 2.5 MG: 5 TABLET ORAL at 07:20

## 2022-06-18 RX ADMIN — ASPIRIN 81 MG: 81 TABLET, COATED ORAL at 08:42

## 2022-06-18 RX ADMIN — OXYCODONE HYDROCHLORIDE AND ACETAMINOPHEN 500 MG: 500 TABLET ORAL at 08:42

## 2022-06-18 RX ADMIN — RIVAROXABAN 2.5 MG: 2.5 TABLET, FILM COATED ORAL at 00:29

## 2022-06-18 RX ADMIN — SODIUM CHLORIDE, PRESERVATIVE FREE 10 ML: 5 INJECTION INTRAVENOUS at 05:09

## 2022-06-18 RX ADMIN — ACETAMINOPHEN 1000 MG: 500 TABLET ORAL at 05:09

## 2022-06-18 RX ADMIN — WATER 2 G: 1 INJECTION INTRAMUSCULAR; INTRAVENOUS; SUBCUTANEOUS at 07:20

## 2022-06-18 RX ADMIN — ACETAMINOPHEN 1000 MG: 500 TABLET ORAL at 08:42

## 2022-06-18 RX ADMIN — PANTOPRAZOLE SODIUM 20 MG: 20 TABLET, DELAYED RELEASE ORAL at 07:20

## 2022-06-18 RX ADMIN — Medication 1000 UNITS: at 08:42

## 2022-06-18 NOTE — PROGRESS NOTES
Problem: Falls - Risk of  Goal: *Absence of Falls  Description: Document Blake Bare Fall Risk and appropriate interventions in the flowsheet.   Outcome: Resolved/Met  Note: Fall Risk Interventions:            Medication Interventions: Teach patient to arise slowly                   Problem: Patient Education: Go to Patient Education Activity  Goal: Patient/Family Education  Outcome: Resolved/Met

## 2022-06-18 NOTE — OP NOTES
[facility]  OPERATIVE REPORT    Name:  Pritesh Patton  MR#:  [id number]  :  1950  ACCOUNT #:  [de-identified]  DATE OF SERVICE:  2022    PREOPERATIVE DIAGNOSES:  Cervical spondylosis and cervical stenosis, C4-C5, C5-C6, and C6-C7. POSTOPERATIVE DIAGNOSES:  Cervical spondylosis and cervical stenosis, C4-C5, C5-C6, C6-C7. PROCEDURES PERFORMED:  1. Anterior cervical diskectomy for decompression, C4-C5, C5-C6, C6-C7.  2.  Anterior interbody fusion, C4-C5, C5-C6, C6-C7. 3.  Anterior plate fixation, C4 to C7. SURGEON:  Jasmine Parra MD    ASSISTANT:  Jerson Ramos PA-C    ANESTHESIA:  General.    COMPLICATIONS:  None. SPECIMENS REMOVED:  No specimens. IMPLANTS:  Instrumentation includes Medtronic Zevo anterior cervical plating system and Medtronic Titan interbody grafting system. ESTIMATED BLOOD LOSS:  Minimal.    INDICATIONS:  This is a 61-year-old gentleman with chronic neck pain, right-sided radicular symptoms, right arm weakness, cervical spondylosis at C4-C5, C5-C6, and C6-C7 with kyphosis and severe stenosis. The patient had failed conservative treatment, understood the risks and benefits, elected to proceed. PROCEDURE:  The patient was identified, brought to the operative suite, underwent general anesthesia without difficulty. Placed in supine position. Preoperative neuromonitoring was placed. Baselines were obtained. Arms were tucked to the side. Perioperative antibiotics given to the patient. Baselines remained stable throughout the surgical procedure. Neck was prepped and draped sterilely. Time-out confirmed. 10 pounds of traction across the head with chin strap. At which time, we then made a transverse incision approximately at the level of the cricoid cartilage, dissecting down to the platysma. We then dissected proximally and distally, identified the spondylitic C4 to C7 regions on lateral fluoroscopy.   Deep radiolucent retractors were placed. Starting distally, we removed the anterior osteophytes from the anterior spine from C4 down to C7, and then we sequentially did [place] pin distraction and did annulotomies sequentially at C6-C7, C5-C6, and C4-C5. Complete diskectomies were completed with pituitary rongeurs and curved curettes. A Midas bur was also used to take down the posterior vertebral osteophytes at each level for access to the posterior disk space and posterior longitudinal ligament. At each level, we also elevated the ligament and resected it for central canal decompression. Bilateral foraminotomies were performed with undercutting the uncinate processes with #1 and #2 Kerrison for decompression of the foramen, particularly on the right hand side. We successfully decompressed C4-C5, C5-C6, and C6-C7 centrally as well as in the foraminal region. After completing the decompression, neuromonitoring was stable. We then did trial spacers with the Titan aftab interbody grafting system. We used a large footprint at each level with 7 mm height grafts to provide good anterior column reconstruction as well as restoration of foraminal height. Endplates were rasped to lightly bleeding bone. Each graft was packed with a combination of Valeria and i-FACTOR. It was then impacted into place with neuromonitoring and fluoroscopy. Alignment was stable. We then contoured a 53 mm Medtronic Zevo anterior cervical plate. Temporary fixation with threaded pin followed by 3.5 x 17 mm variable angle screws at each level. Good fixation was obtained. Locking mechanisms were engaged. Final x-rays demonstrated stable fixation. FloSeal for hemostasis. VersaShield for anti-adhesion and dysphagia prevention. 3-0 Monocryl on the skin. Light dressing placed. The patient returned to the PACU in stable condition. The physician assistant was present during the entire operative procedure and assisted in all critical elements of the surgery.  No surgical assist or resident was available.      Valerie Cheng MD      [id number]  D:  06/18/2022 7:29  T:  06/18/2022 12:00  JOB #:  [id number]

## 2022-06-18 NOTE — PROGRESS NOTES
The following herbal, alternative, and/or nutritional/dietary supplement product(s) has been discontinued  per [employer] approved policy:    Fish oil-omega-3 fatty acids (Fish OiL) and Lysine 500 mg daily     Please reorder upon discharge if appropriate.

## 2022-06-18 NOTE — PROGRESS NOTES
PHYSICAL THERAPY EVALUATION/DISCHARGE  Patient: Brianda Winters (65 y.o. male)  Date: 6/18/2022  Primary Diagnosis: Cervical stenosis of spine [M48.02]  Cervical disc herniation [M50.20]  Procedure(s) (LRB):  ANTERIOR CERVICAL DISCECTOMY AND FUSION C4-C7 (N/A) 1 Day Post-Op   Precautions: spinal precautions          ASSESSMENT  Based on the objective data described below, the patient presents with little to no functional deficits as compared to PLOF. This is a 66year old male who is POD#1 s/p ACDF C4-7. Patient received at EOB with soft collar on and eager to participate and to discharge home. Patient unaware of spinal precautions and noted to  Be breaking precautions (twisting head, looking up and down). Educated patient on precautions and pt expressed verbal understanding. Patient is mod I for all mobility without AD. Patient requires supervision for safety on stairs. Patient able to repeat spinal precautions at end of session. Is cleared for discharge when medically stable. No further PT needs. Other factors to consider for discharge: lives with wife, high PLOF     Further skilled acute physical therapy is not indicated at this time. PLAN :  Recommendation for discharge: (in order for the patient to meet his/her long term goals)  No skilled physical therapy/ follow up rehabilitation needs identified at this time. This discharge recommendation:  Has been made in collaboration with the attending provider and/or case management    IF patient discharges home will need the following DME: none       SUBJECTIVE:   Patient stated I am ready to get out of here.     OBJECTIVE DATA SUMMARY:   HISTORY:    Past Medical History:   Diagnosis Date    CAD (coronary artery disease)     DJD (degenerative joint disease) of cervical spine     GERD (gastroesophageal reflux disease)     Hypertension     Thyroid disease      Past Surgical History:   Procedure Laterality Date    HX COLONOSCOPY      HX HEENT  1986 thyroid lobectomy    HX HEENT      wisdom teeth removal    HX KNEE REPLACEMENT Right 2006    partial knee replacement    HX LUMBAR LAMINECTOMY  2006    HX ORTHOPAEDIC Right 2005    attached muscle    HX ORTHOPAEDIC Right 2005    attached muscle    HX ORTHOPAEDIC Left 2022    attached muscle    HX PACEMAKER  2018    DC CABG, ARTERY-VEIN, THREE  2017    VASCULAR SURGERY PROCEDURE UNLIST Right     stent inserted in leg  x5       Prior level of function: independent with all mobility without AD, active lifestyle   Personal factors and/or comorbidities impacting plan of care: alert and oriented, motivated to discharge    Home Situation  # Steps to Enter: 3  Rails to Enter: Yes  Hand Rails : Bilateral  One/Two Story Residence: One story  Living Alone: Yes  Support Systems: Spouse/Significant Other,Child(cha)  Patient Expects to be Discharged to[de-identified] Home (will arrange home health if recommended)  Current DME Used/Available at Home: Shower chair,Grab bars  Tub or Shower Type: Tub/Shower combination    EXAMINATION/PRESENTATION/DECISION MAKING:   Critical Behavior:  Neurologic State: Alert  Orientation Level: Oriented X4  Cognition: Appropriate decision making,Appropriate for age attention/concentration,Appropriate safety awareness,Follows commands     Range Of Motion:  AROM: Within functional limits (cspine limited)  Strength:    Strength: Within functional limits     Tone & Sensation:                  Sensation: Impaired (BUE)               Coordination:  Coordination: Within functional limits  Vision:      Functional Mobility:  Bed Mobility:  Rolling: Modified independent  Supine to Sit: Modified independent  Sit to Supine: Modified independent     Transfers:  Sit to Stand: Modified independent  Stand to Sit: Modified independent              Balance:   Sitting: Intact; Without support  Standing: Intact; Without support  Ambulation/Gait Training:  Distance (ft): 125 Feet (ft)     Ambulation - Level of Assistance: Modified independent         Stairs:  Number of Stairs Trained: 9  Stairs - Level of Assistance: Supervision   Rail Use: Right          Physical Therapy Evaluation Charge Determination   History Examination Presentation Decision-Making   MEDIUM  Complexity : 1-2 comorbidities / personal factors will impact the outcome/ POC  LOW Complexity : 1-2 Standardized tests and measures addressing body structure, function, activity limitation and / or participation in recreation  LOW Complexity : Stable, uncomplicated  LOW Complexity : FOTO score of       Based on the above components, the patient evaluation is determined to be of the following complexity level: LOW     Pain Rating:  No pain reported     Activity Tolerance:   WNL and Good      After treatment patient left in no apparent distress:   Sitting in chair and Call bell within reach    COMMUNICATION/EDUCATION:   The patients plan of care was discussed with: Occupational therapist, Registered nurse and Case management. Fall prevention education was provided and the patient/caregiver indicated understanding.     Thank you for this referral.  Xu Juarez, PT   Time Calculation: 19 mins

## 2022-06-18 NOTE — PROGRESS NOTES
OCCUPATIONAL THERAPY EVALUATION/DISCHARGE  Patient: Mat Avitia (16 y.o. male)  Date: 6/18/2022  Primary Diagnosis: Cervical stenosis of spine [M48.02]  Cervical disc herniation [M50.20]  Procedure(s) (LRB):  ANTERIOR CERVICAL DISCECTOMY AND FUSION C4-C7 (N/A) 1 Day Post-Op   Precautions: spine       ASSESSMENT  Based on the objective data described below, the patient is functioning overall at Mod I-SBA following discectomy and fusion C4-C7 post-op day one. Pt cleared for therapy by nursing and received standing in room and willing to work with therapy. Pt slightly impulsive and with quick movements, but followed commands overall. Educated pt on movement precautions, with little adherence throughout session. Bent to don socks, however, corrected to tailor sit with verbal cues. Pt completed functional mobility to bathroom for toileting (Mod I) and returned to EOB for further education. Discussed benefits of HHOT with pt, who expressed concerns with past therapeutic services affecting RUE. Pt able to verbalize movement precautions at end of session and demo'd log roll. Pt would benefit from and this therapist recommends HHOT at discharge for safety in ADLs and IADLs. Current Level of Function (ADLs/self-care): Mod I-SBA    Other factors to consider for discharge: lives with family      PLAN :  Recommendation for discharge: (in order for the patient to meet his/her long term goals)  Occupational therapy at least 2 days/week in the home     This discharge recommendation:  Has not yet been discussed the attending provider and/or case management    IF patient discharges home will need the following DME: none       SUBJECTIVE:   Patient stated I won't be going to the same mary I went to in the past (re: therapy).     OBJECTIVE DATA SUMMARY:   HISTORY:   Past Medical History:   Diagnosis Date    CAD (coronary artery disease)     DJD (degenerative joint disease) of cervical spine     GERD (gastroesophageal reflux disease)     Hypertension     Thyroid disease      Past Surgical History:   Procedure Laterality Date    HX COLONOSCOPY      HX HEENT  1986    thyroid lobectomy    HX HEENT      wisdom teeth removal    HX KNEE REPLACEMENT Right 2006    partial knee replacement    HX LUMBAR LAMINECTOMY  2006    HX ORTHOPAEDIC Right 2005    attached muscle    HX ORTHOPAEDIC Right 2005    attached muscle    HX ORTHOPAEDIC Left 2022    attached muscle    HX PACEMAKER  2018    SD CABG, ARTERY-VEIN, THREE  2017    VASCULAR SURGERY PROCEDURE UNLIST Right     stent inserted in leg  x5       Prior Level of Function/Environment/Context: Pt was independent in ADLs and functional mobility. Lives with family members. Expanded or extensive additional review of patient history:     Home Situation  # Steps to Enter: 3  Rails to Enter: Yes  Hand Rails : Bilateral  One/Two Story Residence: One story  Living Alone: Yes  Support Systems: Spouse/Significant Other,Child(cha)  Patient Expects to be Discharged to[de-identified] Home (will arrange home health if recommended)  Current DME Used/Available at Home: Shower chair,Grab bars  Tub or Shower Type: Tub/Shower combination      EXAMINATION OF PERFORMANCE DEFICITS:  Cognitive/Behavioral Status:  Neurologic State: Alert  Orientation Level: Oriented X4  Cognition: Impulsive  Perception: Appears intact  Perseveration: No perseveration noted  Safety/Judgement: Awareness of environment; Insight into deficits; Decreased awareness of need for safety    Skin: dry, intact     Edema: none noted     Hearing:   wdl    Vision/Perceptual:      Corrective Lenses: Glasses    Range of Motion:    AROM: Within functional limits (cspine limited)     Strength:    Strength: Within functional limits     Coordination:  Coordination: Within functional limits  Fine Motor Skills-Upper: Left Intact; Right Intact (Pt reports R grasp is weak )    Gross Motor Skills-Upper: Left Intact; Right Intact    Tone & Sensation: Sensation: Impaired (BUE)     Balance:  Sitting: Intact; Without support  Standing: Intact; Without support    Functional Mobility and Transfers for ADLs:  Bed Mobility:  Rolling: Modified independent  Supine to Sit: Modified independent  Sit to Supine: Modified independent    Transfers:  Sit to Stand: Modified independent  Stand to Sit: Modified independent  Bathroom Mobility: Modified independent  Toilet Transfer : Modified independent    ADL Assessment:  Feeding: Independent    Oral Facial Hygiene/Grooming: Independent    Bathing: Stand-by assistance    Upper Body Dressing: Stand-by assistance    Lower Body Dressing: Stand-by assistance    Toileting: Modified independent     ADL Intervention and task modifications:  Patient instructed 3/3 cervical spine precautions with verbal cues. Patient instructed and indicated understanding the benefits of maintaining activity tolerance, functional mobility, and independence with self care tasks during acute stay  to ensure safe return home and to baseline. Encouraged patient to increase frequency and duration OOB, not sitting longer than 30 mins without marching/walking with staff, be out of bed for all meals, perform daily ADLs (as approved by RN/MD regarding bathing etc), and performing functional mobility to/from bathroom. Patient instruction and indicated understanding on body mechanics, ergonomics and gravitational force on the spine during different body positions to plan activities in prep for return home to complete basic ADLs, instrumental ADLs and back to work safely. Dressing lower body: Patient instructed to don brace first and on the benefits to remain seated to don all clothing to increase independence with precautions and pain management. Patient instructed and demonstrated tailor sitting for lower body dressing with veral cues.    Home safety: Patient instructed and indicated understanding on home modifications and safety [raise height of ADL objects (i.e. clothing, sink items, fridge items, items to mouth when grooming), change of floor surfaces, clear pathways] to increase independence and fall prevention. Standing: Patient instructed and indicated understanding to walk up to sink/counter top/surfaces, step into walker, square off while using objects, slide objects along surfaces, to increase adherence to back precautions and fall prevention. Occupational Therapy Evaluation Charge Determination   History Examination Decision-Making   LOW Complexity : Brief history review  LOW Complexity : 1-3 performance deficits relating to physical, cognitive , or psychosocial skils that result in activity limitations and / or participation restrictions  LOW Complexity : No comorbidities that affect functional and no verbal or physical assistance needed to complete eval tasks       Based on the above components, the patient evaluation is determined to be of the following complexity level: LOW   Pain Rating:  None reported     Activity Tolerance:   Good    After treatment patient left in no apparent distress:    with PT    COMMUNICATION/EDUCATION:   The patients plan of care was discussed with: Physical therapist and Registered nurse.      Thank you for this referral.  Parisa Lucero OT  Time Calculation: 11 mins

## 2022-06-18 NOTE — PERIOP NOTES
TRANSFER - OUT REPORT:    Verbal report given to [facility] RN(name) on Andrew Clay  being transferred to (unit) for routine post - op       Report consisted of patients Situation, Background, Assessment and   Recommendations(SBAR). Time Pre op antibiotic given:Y  Anesthesia Stop time: Y  Junior Present on Transfer to floor:N  Order for Junior on Chart:N  Discharge Prescriptions with Chart:N    Information from the following report(s) SBAR was reviewed with the receiving nurse. Opportunity for questions and clarification was provided. Is the patient on 02? YES       L/Min 2       Other     Is the patient on a monitor? NO    Is the nurse transporting with the patient? YES    Surgical Waiting Area notified of patient's transfer from PACU? YES      The following personal items collected during your admission accompanied patient upon transfer:   Dental Appliance: Dental Appliances: Lowers,Uppers (with brother)  Vision: Visual Aid: Glasses  Hearing Aid:    Jewelry: Jewelry: None  Clothing: Clothing: Other (comment) (belongings to [employer])  Other Valuables:  Other Valuables: Eyeglasses (sent to pacu)  Valuables sent to safe:

## 2022-06-18 NOTE — PROGRESS NOTES
Problem: Falls - Risk of  Goal: *Absence of Falls  Description: Document [place] Flow Fall Risk and appropriate interventions in the flowsheet.   Outcome: Progressing Towards Goal  Note: Fall Risk Interventions:            Medication Interventions: Teach patient to arise slowly

## 2022-06-18 NOTE — PROGRESS NOTES
Ortho Daily Progress Note    Date of Surgery:  2022      Patient: Radha Urena   YOB: 1950  Age: 66 y.o. SUBJECTIVE:   1 Day Post-Op following ANTERIOR CERVICAL DISCECTOMY AND FUSION C4-C7    The patient states mild neck pain this morning, otherwise without C/O. Moving well, pain controlled,has been cleared by PT for discharge. The patient denies CP, SOB, N/V.     OBJECTIVE:     Vital Signs:    Temp (24hrs), Av.7 °F (36.5 °C), Min:97.5 °F (36.4 °C), Max:98 °F (36.7 °C)    Patient Vitals for the past 24 hrs:   BP Temp Pulse Resp SpO2 Height Weight   22 0835 (!) 149/68 97.9 °F (36.6 °C) 76 17 93 % -- --   22 (!) 178/72 97.5 °F (36.4 °C) 61 16 95 % -- --   22 (!) 168/73 -- 63 12 100 % -- --   22 (!) 165/79 -- 61 13 100 % -- --   22 -- -- 61 12 100 % -- --   22 (!) 152/72 -- 60 14 -- -- --   22 (!) 157/83 -- 61 12 -- -- --   22 -- -- (!) 103 15 -- -- --   22 -- -- 60 18 100 % -- --   22 117/61 -- 63 24 100 % -- --   22 123/78 -- 60 16 96 % -- --   22 (!) 141/74 -- 60 19 97 % -- --   22 (!) 145/70 97.5 °F (36.4 °C) 62 16 97 % -- --   22 135/67 97.5 °F (36.4 °C) 65 16 97 % -- --   22 1442 (!) 143/82 98 °F (36.7 °C) 64 18 99 % 5' 6\" (1.676 m) 165 lb 9.1 oz (75.1 kg)           Physical Exam:  General: A&Ox3, NAD. Respiratory: Respirations are unlabored. Abdomen: S/NT  Surgical site: C,D and I.  Musculoskeletal: Collins , hand intrinsics, wrist flex/ext, biceps, triceps, deltoid intact. Neurological:  Collins UE's/LE's NVI    Laboratory Values:             No results for input(s): HGB, PLT, INR, CREA, GLU, HGBEXT, PLTEXT, INREXT in the last 72 hours.   Lab Results   Component Value Date/Time    Sodium 141 2022 08:44 AM    Potassium 4.2 2022 08:44 AM    Chloride 111 (H) 2022 08:44 AM    CO2 24 2022 08:44 AM Glucose 98 06/08/2022 08:44 AM    BUN 16 06/08/2022 08:44 AM    Creatinine 0.97 06/08/2022 08:44 AM    Calcium 8.8 06/08/2022 08:44 AM         PLAN:     S/P ANTERIOR CERVICAL DISCECTOMY AND FUSION C4-C7 Keep cervical collar on at all times. Hemodynamics Stable. Wound Dressing changes PRN if saturated. Post Operative Pain PO pain meds for pain control. DVT Prophylaxis Mobilize. Discharge Disposition Home today. Follow up with Dr Linus Davies in 2 weeks.             Signed By:     Taiwo Vallejo PA-C  Physician Assistant  [id number] [facility]  [place] [phone]  Office [phone]     June 18, 2022 10:31 AM

## 2022-06-18 NOTE — PROGRESS NOTES
Transition of Care Plan  RUR 2%    Disposition   Home    Transportation   Wife    [address]  -- Not recommended by therapy   Patient agrees with no home health services    Medical follow up  PCP and Specialist    Contact  Wife  Amilcar Cash   [phone]    Reason for Admission:  Spinal Canal DDD   Anterior cervical discetomy and fusion 6/17/22              RUR Score:          2%           Plan for utilizing home health: Will arrange if ordered      PCP: First and Last name:  Agustín Pina MD     Name of Practice:    Are you a current patient: Yes/No:    Approximate date of last visit:  A couple of weeks ago    Can you participate in a virtual visit with your PCP:                     Current Advanced Directive/Advance Care Plan: Full Code    Healthcare Decision Maker:   Click here to complete [address] including selection of the Healthcare Decision Maker Relationship (ie \"Primary\")                           Transition of Care Plan:           Home with wife and medical follow up   Cm will arrange home health if recommended. Patient does not feel he needs HH    Cm met with patient in his room to introduce self and explain role. Patient was alert and oriented-- Confirmed demographics, PCP and insurance. Lives in one level home with his wife. Self care and independent prior to admission. He has good family support. [facility]   Yes  Cannot remember the name of the agency  Rehab/Snf   None  DME    None  Self care and independent prior to admission with Adl's and Iadl's    Cm will follow patient until discharge-- therapy evaluated and no home health needs identified. Wife will transport home    Care Management Interventions  PCP Verified by CM: Yes  Mode of Transport at Discharge: Other (see comment) (wife)  Transition of Care Consult (CM Consult):  Other  Discharge Durable Medical Equipment: No  Physical Therapy Consult: Yes  Occupational Therapy Consult: Yes  Support Systems: Spouse/Significant Other,Child(cha)  Confirm Follow Up Transport: Family  Discharge Location  Patient Expects to be Discharged to[de-identified] Home (will arrange home health if recommended)

## 2022-06-28 ENCOUNTER — OFFICE VISIT (OUTPATIENT)
Dept: ORTHOPEDIC SURGERY | Age: 72
End: 2022-06-28
Payer: MEDICARE

## 2022-06-28 VITALS — HEIGHT: 66 IN | BODY MASS INDEX: 26.2 KG/M2 | WEIGHT: 163 LBS

## 2022-06-28 DIAGNOSIS — Z98.1 S/P CERVICAL SPINAL FUSION: Primary | ICD-10-CM

## 2022-06-28 PROCEDURE — 99024 POSTOP FOLLOW-UP VISIT: CPT | Performed by: PHYSICIAN ASSISTANT

## 2022-06-28 RX ORDER — CYCLOBENZAPRINE HCL 10 MG
10 TABLET ORAL
Qty: 30 TABLET | Refills: 0 | Status: SHIPPED | OUTPATIENT
Start: 2022-06-28

## 2022-06-28 NOTE — PROGRESS NOTES
Kenyatta Burnette (: 1950) is a 66 y.o. male patient here for evaluation of the following chief complaint(s):  Surgical Follow-up (Sx 22 C4/7 ACDF (PO#2))         ASSESSMENT/PLAN:  Below is the assessment and plan developed based on review of pertinent history, physical exam, labs, studies, and medications. 1. S/P cervical spinal fusion  -     XR SPINE CERV PA LAT ODONT 3 V MAX; Future      The patient's radiologic findings have been reviewed with him in detail today. He is doing quite well at this point in his postoperative recovery. He will continue wearing his soft collar, continue to limit his cervical motion and lifting. He will be fitted for a spine stimulator today, and will continue wearing this as directed. We will see him back for his next postoperative follow-up visit in 4 weeks. He will continue with use of Tylenol and will try Flexeril as needed for spasms. Return in about 4 weeks (around 2022) for Post op follow up, With Dr. Elisabeth Terry. SUBJECTIVE/OBJECTIVE:  Kenyatta Burnette (: 1950) is a 66 y.o. male who presents today for the following:  Chief Complaint   Patient presents with    Surgical Follow-up     Sx 22 C4/7 ACDF (PO#2)        HPI   Mr. Sohail Farrar presents today for routine postoperative follow-up visit. He is approximately 2 weeks out from his recent cervical fusion. He is doing quite well at this point his postoperative recovery. He has been tolerating his soft collar well and denies any incisional difficulties. He has been having minimal pain, but does report some posterior neck tightness and soreness with radiation into bilateral trapezius region. He is having some intermittent spasms. He is not using any pain medications, but using occasional Tylenol. He is being fitted today for his bone stimulator.     IMAGING:  XR Results (most recent):  Results from Appointment encounter on 22    XR SPINE CERV PA LAT ODONT 3 V MAX    Narrative  AP and lateral films of the cervical spine reveal a stable cervical fusion from C4-7 with stable instrumentation. MRI Results (most recent): Allergies   Allergen Reactions    Morphine Nausea and Vomiting       Current Outpatient Medications   Medication Sig    cyclobenzaprine (FLEXERIL) 10 mg tablet Take 1 Tablet by mouth two (2) times daily as needed for Muscle Spasm(s).  naloxone (Narcan) 4 mg/actuation nasal spray Use 1 spray intranasally, then discard. Repeat with new spray every 2 min as needed for opioid overdose symptoms, alternating nostrils.  cholecalciferol (Vitamin D3) 25 mcg (1,000 unit) cap Take 1,000 Units by mouth every morning.  fish oil-omega-3 fatty acids (Fish OiL) 340-1,000 mg capsule Take  by mouth daily. Takes 2, 400 mg    lysine (L-LYSINE) 500 mg tab tablet Take 500 mg by mouth every morning.  ascorbic acid, vitamin C, (Vitamin C) 500 mg tablet Take 500 mg by mouth daily.  lisinopriL (PRINIVIL, ZESTRIL) 2.5 mg tablet Take 2.5 mg by mouth every morning.  pantoprazole (Protonix) 20 mg tablet Take 20 mg by mouth every morning.  aspirin delayed-release 81 mg tablet aspirin 81 mg tablet,delayed release   Take 1 tablet every day by oral route.  atorvastatin (LIPITOR) 80 mg tablet atorvastatin 80 mg tablet   TAKE 1 TABLET EVERY DAY    levothyroxine (SYNTHROID) 112 mcg tablet levothyroxine 112 mcg tablet   TAKE 1 TABLET EVERY DAY    rivaroxaban (Xarelto) 2.5 mg tablet Xarelto 2.5 mg tablet   TAKE 1 TABLET BY MOUTH TWICE DAILY     No current facility-administered medications for this visit.        Past Medical History:   Diagnosis Date    CAD (coronary artery disease)     DJD (degenerative joint disease) of cervical spine     GERD (gastroesophageal reflux disease)     Hypertension     Thyroid disease         Past Surgical History:   Procedure Laterality Date    HX COLONOSCOPY      HX HEENT  1986    thyroid lobectomy    HX HEENT      wisdom teeth removal    HX KNEE REPLACEMENT Right 2006    partial knee replacement    HX LUMBAR LAMINECTOMY  2006    HX ORTHOPAEDIC Right 2005    attached muscle    HX ORTHOPAEDIC Right     attached muscle    HX ORTHOPAEDIC Left     attached muscle    HX PACEMAKER  2018    [place] CABG, ARTERY-VEIN, THREE  2017    VASCULAR SURGERY PROCEDURE UNLIST Right     stent inserted in leg  x5       Family History   Problem Relation Age of Onset   Rosamaria Plata Stroke Mother     Alcohol abuse Father     Stroke Half-sister     Diabetes Half-sister     OSTEOARTHRITIS Half-sister     Cancer Half-brother     Diabetes Half-brother     Thyroid Disease Son         Social History     Tobacco Use    Smoking status: Former Smoker     Years: 50.00     Quit date: 2017     Years since quittin.4    Smokeless tobacco: Never Used   Substance Use Topics    Alcohol use: Yes     Comment: drink beer monthly        Review of Systems   Constitutional: Negative. Respiratory: Negative. Cardiovascular: Negative. Gastrointestinal: Negative. Endocrine: Negative. Genitourinary: Negative. Musculoskeletal: Positive for neck pain and neck stiffness. Skin: Negative. Allergic/Immunologic: Negative. Hematological: Negative. Psychiatric/Behavioral: Negative. All other systems reviewed and are negative. Pain Assessment  2022   Location of Pain Neck; Shoulder;Hand   Location Modifiers Right   Severity of Pain 3   Quality of Pain Dull; Sharp   Frequency of Pain Intermittent   Relieving Factors NSAID   Result of Injury -           Vitals:  Ht 5' 6\" (1.676 m)   Wt 163 lb (73.9 kg)   BMI 26.31 kg/m²    Body mass index is 26.31 kg/m². Physical Exam    Integumentary  Assessment of Surgical Incision - healing and consistent with normal anticipated wound healing. Neurologic  Overall Assessment of Muscle Strength and Tone reveals  Upper Extremities - Right Deltoid - 5/5. Left Deltoid - 5/5. Right Bicep - 5/5. Left Bicep - 5/5.  Right Tricep - 5/5. Left Tricep - 5/5. Right Wrist Extensors - 5/5. Left Wrist Extensors - 5/5. Right Wrist Flexors - 5/5. Left Wrist Flexors - 5/5. Right Intrinsics - 5/5. Left Intrinsics - 5/5. General Assessment of Reflexes  Right Hand - Stinson's sign is negative in the right hand. Left Hand - Stinson's sign is negative in the left hand. Reflexes (Dermatomes)  2/2 Normal - Left Bicep (C5-6), Left Tricep (C7-8), Left Brachioradialis (C5-6), Right Bicep (C5-6), Right Tricep (C7-8) and Right Brachioradialis (C5-6). Musculoskeletal  Global Assessment  Examination of related systems reveals - well-developed, well-nourished, in no acute distress, alert and oriented x 3 and normal coordination. Gait and Station - normal gait and station and normal posture. Spine/Ribs/Pelvis  Cervical Spine - Evaluation of related systems reveals - no lymphadenopathy and neurovascularly intact bilaterally. Inspection and Palpation - Tenderness - moderate and localized. Assessment of pain reveals the following findings: - Location - cervical area. Dr. Bird Lopez was available for immediate consult during this encounter. An electronic signature was used to authenticate this note.   -- BRENTON Gonsales

## 2022-06-28 NOTE — PROGRESS NOTES
1. Have you been to the ER, urgent care clinic since your last visit? Hospitalized since your last visit? No    2. Have you seen or consulted any other health care providers outside of the [address] [place] [address] since your last visit? Include any pap smears or colon screening.  No    Chief Complaint   Patient presents with    Surgical Follow-up     Sx 6/17/22 C4/7 ACDF (PO#2)

## 2022-07-26 ENCOUNTER — OFFICE VISIT (OUTPATIENT)
Dept: ORTHOPEDIC SURGERY | Age: 72
End: 2022-07-26
Payer: MEDICARE

## 2022-07-26 VITALS — BODY MASS INDEX: 26.31 KG/M2 | WEIGHT: 163 LBS

## 2022-07-26 DIAGNOSIS — Z98.1 S/P CERVICAL SPINAL FUSION: Primary | ICD-10-CM

## 2022-07-26 PROCEDURE — 99024 POSTOP FOLLOW-UP VISIT: CPT | Performed by: ORTHOPAEDIC SURGERY

## 2022-07-26 NOTE — PROGRESS NOTES
Nanci Closs (: 1950) is a 66 y.o. male, patient, here for evaluation of the following chief complaint(s):  Surgical Follow-up       ASSESSMENT/PLAN:    Below is the assessment and plan developed based on review of pertinent history, physical exam, labs, studies, and medications. He is 6 weeks out from a anterior cervical discectomy fusion. Overall he is doing well. He does use occasional muscle relaxant. He will increase activities. He will stop the spinal cord stimulator. I will see him back in 2 more months    1. S/P cervical spinal fusion  -     XR SPINE CERV PA LAT ODONT 3 V MAX; Future      No follow-ups on file. SUBJECTIVE/OBJECTIVE:  Nanci Closs (: 1950) is a 66 y.o. male. Pain Assessment  2022   Location of Pain Neck; Shoulder;Hand   Location Modifiers Right   Severity of Pain 3   Quality of Pain Dull; Sharp   Frequency of Pain Intermittent   Relieving Factors NSAID   Result of Injury -        6 weeks out from his anterior cervical discectomy and fusion. Doing well. No complaints. Imaging:    XR Results (most recent):  Results from Appointment encounter on 22    XR SPINE CERV PA LAT ODONT 3 V MAX    Narrative  AP and lateral of the cervical spine demonstrates a stable ACDF C4-C7. No acute changes noted. MRI Results (most recent):    None      Allergies   Allergen Reactions    Morphine Nausea and Vomiting       Current Outpatient Medications   Medication Sig    cyclobenzaprine (FLEXERIL) 10 mg tablet Take 1 Tablet by mouth two (2) times daily as needed for Muscle Spasm(s). cholecalciferol (VITAMIN D3) 25 mcg (1,000 unit) cap Take 1,000 Units by mouth every morning. fish oil-omega-3 fatty acids 340-1,000 mg capsule Take  by mouth daily. Takes 2, 400 mg    lysine (L-LYSINE) 500 mg tab tablet Take 500 mg by mouth every morning. ascorbic acid, vitamin C, (VITAMIN C) 500 mg tablet Take 500 mg by mouth daily.     lisinopriL (PRINIVIL, ZESTRIL) 2.5 mg tablet Take 2.5 mg by mouth every morning. pantoprazole (PROTONIX) 20 mg tablet Take 20 mg by mouth every morning. aspirin delayed-release 81 mg tablet aspirin 81 mg tablet,delayed release   Take 1 tablet every day by oral route. atorvastatin (LIPITOR) 80 mg tablet atorvastatin 80 mg tablet   TAKE 1 TABLET EVERY DAY    levothyroxine (SYNTHROID) 112 mcg tablet levothyroxine 112 mcg tablet   TAKE 1 TABLET EVERY DAY    rivaroxaban (XARELTO) 2.5 mg tablet Xarelto 2.5 mg tablet   TAKE 1 TABLET BY MOUTH TWICE DAILY    naloxone (Narcan) 4 mg/actuation nasal spray Use 1 spray intranasally, then discard. Repeat with new spray every 2 min as needed for opioid overdose symptoms, alternating nostrils. (Patient not taking: Reported on 7/26/2022)     No current facility-administered medications for this visit.        Past Medical History:   Diagnosis Date    CAD (coronary artery disease)     DJD (degenerative joint disease) of cervical spine     GERD (gastroesophageal reflux disease)     Hypertension     Thyroid disease         Past Surgical History:   Procedure Laterality Date    HX COLONOSCOPY      HX HEENT  1986    thyroid lobectomy    HX HEENT      wisdom teeth removal    HX KNEE REPLACEMENT Right 2006    partial knee replacement    HX LUMBAR LAMINECTOMY  2006    HX ORTHOPAEDIC Right 2005    attached muscle    HX ORTHOPAEDIC Right 2005    attached muscle    HX ORTHOPAEDIC Left 2022    attached muscle    HX PACEMAKER  2018    VT CABG, ARTERY-VEIN, THREE  2017    VASCULAR SURGERY PROCEDURE UNLIST Right     stent inserted in leg  x5       Family History   Problem Relation Age of Onset    Stroke Mother     Alcohol abuse Father     Stroke Half-sister     Diabetes Half-sister     OSTEOARTHRITIS Half-sister     Cancer Half-brother     Diabetes Half-brother     Thyroid Disease Son         Social History     Tobacco Use    Smoking status: Former     Years: 50.00     Types: Cigarettes     Quit date: 2017     Years since quittin.5    Smokeless tobacco: Never   Vaping Use    Vaping Use: Never used   Substance Use Topics    Alcohol use: Yes     Comment: drink beer monthly    Drug use: Never        Review of Systems       Vitals: Wt 163 lb (73.9 kg)   BMI 26.31 kg/m²    Body mass index is 26.31 kg/m². Ortho Exam       Integumentary  Assessment of Surgical Incision - healing and consistent with normal anticipated wound healing. Neurologic  Overall Assessment of Muscle Strength and Tone reveals  Upper Extremities - Right Deltoid - 5/5. Left Deltoid - 5/5. Right Bicep - 5/5. Left Bicep - 5/5. Right Tricep - 5/5. Left Tricep - 5/5. Right Wrist Extensors - 5/5. Left Wrist Extensors - 5/5. Right Wrist Flexors - 5/5. Left Wrist Flexors - 5/5. Right Intrinsics - 5/5. Left Intrinsics - 5/5. General Assessment of Reflexes  Right Hand - Stinson's sign is negative in the right hand. Left Hand - Stinson's sign is negative in the left hand. Reflexes (Dermatomes)  2/2 Normal - Left Bicep (C5-6), Left Tricep (C7-8), Left Brachioradialis (C5-6), Right Bicep (C5-6), Right Tricep (C7-8) and Right Brachioradialis (C5-6). Musculoskeletal  Global Assessment  Examination of related systems reveals - well-developed, well-nourished, in no acute distress, alert and oriented x 3 and normal coordination. Gait and Station - normal gait and station and normal posture. Spine/Ribs/Pelvis  Cervical Spine - Evaluation of related systems reveals - no lymphadenopathy and neurovascularly intact bilaterally. Inspection and Palpation - Tenderness - moderate and localized. Assessment of pain reveals the following findings: - Location - cervical area. An electronic signature was used to authenticate this note.   -- Tiago Tristan MD

## 2023-03-14 ENCOUNTER — OFFICE VISIT (OUTPATIENT)
Dept: ORTHOPEDIC SURGERY | Age: 73
End: 2023-03-14

## 2023-03-14 VITALS — WEIGHT: 163 LBS | HEIGHT: 66 IN | BODY MASS INDEX: 26.2 KG/M2

## 2023-03-14 DIAGNOSIS — M48.02 CERVICAL STENOSIS OF SPINE: ICD-10-CM

## 2023-03-14 DIAGNOSIS — M54.12 CERVICAL RADICULITIS: ICD-10-CM

## 2023-03-14 DIAGNOSIS — Z98.1 S/P CERVICAL SPINAL FUSION: Primary | ICD-10-CM

## 2023-03-14 NOTE — PROGRESS NOTES
1. Have you been to the ER, urgent care clinic since your last visit? Hospitalized since your last visit? No    2. Have you seen or consulted any other health care providers outside of the [address] [place] [address] since your last visit? Include any pap smears or colon screening.  No    Chief Complaint   Patient presents with    Neck Pain     Numbness and Tingling in Right Arm, 9 mo S/P C4/7 ACDF (6/17/2022)

## 2023-03-14 NOTE — LETTER
3/14/2023    Patient: Miracle Bryant   YOB: 1950   Date of Visit: 3/14/2023     Geovanna Naranjo MD  [address] [facility] [address]  Via Fax: [phone]    Dear Geovanna Naranjo MD,      Thank you for referring Mr. Celia Isaac to [facility] for evaluation. My notes for this consultation are attached. If you have questions, please do not hesitate to call me. I look forward to following your patient along with you.       Sincerely,    BRENTON Rodrigues

## 2023-03-14 NOTE — PROGRESS NOTES
Claudio Soni (: 1950) is a 74 y.o. male patient here for evaluation of the following chief complaint(s):  Neck Pain (Numbness and Tingling in Right Arm, 9 mo S/P C4/7 ACDF (2022))         ASSESSMENT/PLAN:  Below is the assessment and plan developed based on review of pertinent history, physical exam, labs, studies, and medications. 1. S/P cervical spinal fusion  -     XR SPINE CERV PA LAT ODONT 3 V MAX; Future  -     CT SPINE CERV WO CONT; Future  2. Cervical radiculitis  -     CT SPINE CERV WO CONT; Future  3. Cervical stenosis of spine  -     CT SPINE CERV WO CONT; Future      The patient's radiologic findings have been reviewed with him in detail today. He had been doing well from a cervical spine standpoint until approximately 2 months ago when he began with sudden onset of intermittent right upper extremity numbness. He has no substantial neck discomfort or radicular pain. I would like for him to obtain a CT scan of the cervical spine to evaluate his fusion mass and for any junctional stenosis with compression. He may use over-the-counter medications as needed. We will see him back for follow-up visit after CT scan is complete. Return for CT follow up, With a PA. SUBJECTIVE/OBJECTIVE:  Claudio Soni (: 1950) is a 74 y.o. male who presents today for the following:  Chief Complaint   Patient presents with    Neck Pain     Numbness and Tingling in Right Arm, 9 mo S/P C4/7 ACDF (2022)        Neck Pain     Mr. Nora Fisher today as a known patient to the practice. He is approximately 9 months out from his recent cervical fusion. He states that he has done well until approximately 2 months ago when he began with a sudden onset of right upper extremity and right hand numbness that has been intermittent in nature. He states that the frequency has increased over the past 2 months. He has mild hand weakness. No arm pain. No significant neck discomfort.   He denies any difficulty with balance, falls, or dropping objects. No left arm symptoms. IMAGING:  XR Results (most recent):  Results from Appointment encounter on 03/14/23    XR SPINE CERV PA LAT ODONT 3 V MAX    Narrative  AP and lateral films of the cervical spine reveal stable cervical fusion from C4-C7 with stable instrumentation. Mild to moderate junctional stenosis above the fusion with minimal anterolisthesis at C3-4 and C4-5. MRI Results (most recent): Allergies   Allergen Reactions    Morphine Nausea and Vomiting       Current Outpatient Medications   Medication Sig    cyclobenzaprine (FLEXERIL) 10 mg tablet Take 1 Tablet by mouth two (2) times daily as needed for Muscle Spasm(s). naloxone (Narcan) 4 mg/actuation nasal spray Use 1 spray intranasally, then discard. Repeat with new spray every 2 min as needed for opioid overdose symptoms, alternating nostrils. cholecalciferol (VITAMIN D3) 25 mcg (1,000 unit) cap Take 1,000 Units by mouth every morning. fish oil-omega-3 fatty acids 340-1,000 mg capsule Take  by mouth daily. Takes 2, 400 mg    lysine (L-LYSINE) 500 mg tab tablet Take 500 mg by mouth every morning. ascorbic acid, vitamin C, (VITAMIN C) 500 mg tablet Take 500 mg by mouth daily. lisinopriL (PRINIVIL, ZESTRIL) 2.5 mg tablet Take 2.5 mg by mouth every morning. pantoprazole (PROTONIX) 20 mg tablet Take 20 mg by mouth every morning. aspirin delayed-release 81 mg tablet aspirin 81 mg tablet,delayed release   Take 1 tablet every day by oral route. atorvastatin (LIPITOR) 80 mg tablet atorvastatin 80 mg tablet   TAKE 1 TABLET EVERY DAY    levothyroxine (SYNTHROID) 112 mcg tablet levothyroxine 112 mcg tablet   TAKE 1 TABLET EVERY DAY    rivaroxaban (XARELTO) 2.5 mg tablet Xarelto 2.5 mg tablet   TAKE 1 TABLET BY MOUTH TWICE DAILY     No current facility-administered medications for this visit.        Past Medical History:   Diagnosis Date    CAD (coronary artery disease)     LEANN (degenerative joint disease) of cervical spine     GERD (gastroesophageal reflux disease)     Hypertension     Thyroid disease         Past Surgical History:   Procedure Laterality Date    HX COLONOSCOPY      HX HEENT  1986    thyroid lobectomy    HX HEENT      wisdom teeth removal    HX KNEE REPLACEMENT Right 2006    partial knee replacement    HX LUMBAR LAMINECTOMY  2006    HX ORTHOPAEDIC Right 2005    attached muscle    HX ORTHOPAEDIC Right 2005    attached muscle    HX ORTHOPAEDIC Left     attached muscle    HX PACEMAKER  2018    MD CORONARY ARTERY BYP W/VEIN & ARTERY GRAFT 3 VEIN  2017    MD UNLISTED PROCEDURE VASCULAR SURGERY Right     stent inserted in leg  x5       Family History   Problem Relation Age of Onset    Stroke Mother     Alcohol abuse Father     Stroke Half-sister     Diabetes Half-sister     OSTEOARTHRITIS Half-sister     Cancer Half-brother     Diabetes Half-brother     Thyroid Disease Son         Social History     Tobacco Use    Smoking status: Former     Years: 50.00     Types: Cigarettes     Quit date: 2017     Years since quittin.2    Smokeless tobacco: Never   Substance Use Topics    Alcohol use: Yes     Comment: drink beer monthly        Review of Systems   Constitutional: Negative. Respiratory: Negative. Cardiovascular: Negative. Gastrointestinal: Negative. Endocrine: Negative. Genitourinary: Negative. Musculoskeletal:  Negative for neck pain. Skin: Negative. Allergic/Immunologic: Negative. Neurological:  Positive for numbness. Hematological: Negative. Psychiatric/Behavioral: Negative. All other systems reviewed and are negative. Pain Assessment  2022   Location of Pain Neck; Shoulder;Hand   Location Modifiers Right   Severity of Pain 3   Quality of Pain Dull; Sharp   Frequency of Pain Intermittent   Relieving Factors NSAID   Result of Injury -           Vitals:  Ht 5' 6\" (1.676 m)   Wt 163 lb (73.9 kg)   BMI 26.31 kg/m²    Body mass index is 26.31 kg/m². Physical Exam    Neurologic  Overall Assessment of Muscle Strength and Tone reveals  Upper Extremities - Right Deltoid - 5/5. Left Deltoid - 5/5. Right Bicep - 5/5. Left Bicep - 5/5. Right Tricep - 5/5. Left Tricep - 5/5. Right Wrist Extensors - 5/5. Left Wrist Extensors - 5/5. Right Wrist Flexors - 5/5. Left Wrist Flexors - 5/5. Right Intrinsics - 5/5. Left Intrinsics - 5/5. General Assessment of Reflexes  Right Hand - Stinson's sign is negative in the right hand. Left Hand - Stinson's sign is negative in the left hand. Reflexes (Dermatomes)  2/2 Normal - Left Bicep (C5-6), Left Tricep (C7-8), Left Brachioradialis (C5-6), Right Bicep (C5-6), Right Tricep (C7-8) and Right Brachioradialis (C5-6). Musculoskeletal  Global Assessment  Examination of related systems reveals - well-developed, well-nourished, in no acute distress, alert and oriented x 3 and normal coordination. Gait and Station - normal gait and station and normal posture. Spine/Ribs/Pelvis  Cervical Spine - Evaluation of related systems reveals - no lymphadenopathy and neurovascularly intact bilaterally. Inspection and Palpation - Tenderness - moderate and localized. Assessment of pain reveals the following findings: - Location - cervical area. ROJM - Flexion - 85 °. Right Lateral Flexion - 35 °. Left Lateral Flexion - 35 °. Extension - 70 °. Right Rotation - 80 °. Left Rotation - 80 °. Cervical Spine - Functional Testing - Foraminal Compression/Spurling's Test negative, Shoulder Depression Test negative, Upper Limb Tension Test negative. Lumbosacral Spine - Examination of the lumbosacral spine reveals - no tenderness to palpation, no pain, normal strength and tone, no laxity or crepitus and normal lumbosacral spine movements. Dr. Omaira Chaves was available for immediate consult during this encounter. An electronic signature was used to authenticate this note.   -- BRENTON Elena

## 2023-03-17 ENCOUNTER — HOSPITAL ENCOUNTER (OUTPATIENT)
Dept: CT IMAGING | Age: 73
Discharge: HOME | End: 2023-03-17
Attending: PHYSICIAN ASSISTANT
Payer: MEDICARE

## 2023-03-17 DIAGNOSIS — M48.02 CERVICAL STENOSIS OF SPINE: ICD-10-CM

## 2023-03-17 DIAGNOSIS — Z98.1 S/P CERVICAL SPINAL FUSION: ICD-10-CM

## 2023-03-17 DIAGNOSIS — M54.12 CERVICAL RADICULITIS: ICD-10-CM

## 2023-03-17 PROCEDURE — 72125 CT NECK SPINE W/O DYE: CPT

## 2023-03-21 ENCOUNTER — OFFICE VISIT (OUTPATIENT)
Dept: ORTHOPEDIC SURGERY | Age: 73
End: 2023-03-21
Payer: MEDICARE

## 2023-03-21 VITALS — BODY MASS INDEX: 25.71 KG/M2 | WEIGHT: 160 LBS | HEIGHT: 66 IN

## 2023-03-21 DIAGNOSIS — M65.322 TRIGGER FINGER, LEFT INDEX FINGER: ICD-10-CM

## 2023-03-21 DIAGNOSIS — M79.645 FINGER PAIN, LEFT: Primary | ICD-10-CM

## 2023-03-21 PROCEDURE — 3017F COLORECTAL CA SCREEN DOC REV: CPT | Performed by: ORTHOPAEDIC SURGERY

## 2023-03-21 PROCEDURE — G8536 NO DOC ELDER MAL SCRN: HCPCS | Performed by: ORTHOPAEDIC SURGERY

## 2023-03-21 PROCEDURE — 1123F ACP DISCUSS/DSCN MKR DOCD: CPT | Performed by: ORTHOPAEDIC SURGERY

## 2023-03-21 PROCEDURE — G8432 DEP SCR NOT DOC, RNG: HCPCS | Performed by: ORTHOPAEDIC SURGERY

## 2023-03-21 PROCEDURE — 99203 OFFICE O/P NEW LOW 30 MIN: CPT | Performed by: ORTHOPAEDIC SURGERY

## 2023-03-21 PROCEDURE — 1101F PT FALLS ASSESS-DOCD LE1/YR: CPT | Performed by: ORTHOPAEDIC SURGERY

## 2023-03-21 PROCEDURE — G8427 DOCREV CUR MEDS BY ELIG CLIN: HCPCS | Performed by: ORTHOPAEDIC SURGERY

## 2023-03-21 PROCEDURE — G8417 CALC BMI ABV UP PARAM F/U: HCPCS | Performed by: ORTHOPAEDIC SURGERY

## 2023-03-21 NOTE — PROGRESS NOTES
Tennis Humble (: 1950) is a 74 y.o. male patient here for evaluation of the following chief complaint(s):  Hand Pain (Left index finger)       ASSESSMENT/PLAN:  Below is the assessment and plan developed based on review of pertinent history, physical exam, labs, studies, and medications. 1. Finger pain, left  -     XR 2ND FINGER LT MIN 2 V; Future  2. Trigger finger, left index finger    78-year-old male with left index finger trigger. We discussed treatment options including injection, observation, surgery he does want to proceed with operative intervention as he has had injections in his hand previously that were severely painful. We discussed risks, benefits and alternatives to surgery. After thorough discussion ultimately the patient elected to proceed with operative intervention. We discussed the risks including but not limited to postoperative pain, swelling, bruising, bleeding, scarring, infection, damage to neurovascular structures. Risk of permanent or temporary loss of range of motion, need for additional surgery, rejection of foreign material such as metal, suture or other tissue. Plan for left index finger trigger release. Patient verbalized understanding and elected to proceed. All questions were answered to the patient's apparent satisfaction. SUBJECTIVE/OBJECTIVE:  HPI    78-year-old male has had a several month history of left index finger pain, catching and locking. This is occurring at various times throughout the day. Patient reports a gradual onset of symptoms. Duration of problem 3+ months. Symptom Severity 5/10  Symptom Frequency intermittent        Allergies   Allergen Reactions    Morphine Nausea and Vomiting       Current Outpatient Medications   Medication Sig    naloxone (Narcan) 4 mg/actuation nasal spray Use 1 spray intranasally, then discard. Repeat with new spray every 2 min as needed for opioid overdose symptoms, alternating nostrils. cholecalciferol (VITAMIN D3) 25 mcg (1,000 unit) cap Take 1,000 Units by mouth every morning. fish oil-omega-3 fatty acids 340-1,000 mg capsule Take  by mouth daily. Takes 2, 400 mg    lysine (L-LYSINE) 500 mg tab tablet Take 500 mg by mouth every morning. ascorbic acid, vitamin C, (VITAMIN C) 500 mg tablet Take 500 mg by mouth daily. lisinopriL (PRINIVIL, ZESTRIL) 2.5 mg tablet Take 2.5 mg by mouth every morning. pantoprazole (PROTONIX) 20 mg tablet Take 20 mg by mouth every morning. aspirin delayed-release 81 mg tablet aspirin 81 mg tablet,delayed release   Take 1 tablet every day by oral route. atorvastatin (LIPITOR) 80 mg tablet atorvastatin 80 mg tablet   TAKE 1 TABLET EVERY DAY    levothyroxine (SYNTHROID) 112 mcg tablet levothyroxine 112 mcg tablet   TAKE 1 TABLET EVERY DAY    rivaroxaban (XARELTO) 2.5 mg tablet Xarelto 2.5 mg tablet   TAKE 1 TABLET BY MOUTH TWICE DAILY     No current facility-administered medications for this visit.        Social History     Socioeconomic History    Marital status:      Spouse name: Not on file    Number of children: Not on file    Years of education: Not on file    Highest education level: Not on file   Occupational History    Not on file   Tobacco Use    Smoking status: Former     Years: 50.00     Types: Cigarettes     Quit date: 2017     Years since quittin.2    Smokeless tobacco: Never   Vaping Use    Vaping Use: Never used   Substance and Sexual Activity    Alcohol use: Yes     Comment: drink beer monthly    Drug use: Never    Sexual activity: Not Currently   Other Topics Concern    Not on file   Social History Narrative    Not on file     Social Determinants of Health     Financial Resource Strain: Not on file   Food Insecurity: Not on file   Transportation Needs: Not on file   Physical Activity: Not on file   Stress: Not on file   Social Connections: Not on file   Intimate Partner Violence: Not on file   Housing Stability: Not on file Past Surgical History:   Procedure Laterality Date    HX COLONOSCOPY      HX HEENT  1986    thyroid lobectomy    HX HEENT      wisdom teeth removal    HX KNEE REPLACEMENT Right 2006    partial knee replacement    HX LUMBAR LAMINECTOMY  2006    HX ORTHOPAEDIC Right 2005    attached muscle    HX ORTHOPAEDIC Right 2005    attached muscle    HX ORTHOPAEDIC Left 2022    attached muscle    HX PACEMAKER  2018    [place] CORONARY ARTERY BYP W/VEIN & ARTERY GRAFT 3 VEIN  2017    LA UNLISTED PROCEDURE VASCULAR SURGERY Right     stent inserted in leg  x5       Family History   Problem Relation Age of Onset    Stroke Mother     Alcohol abuse Father     Stroke Half-sister     Diabetes Half-sister     OSTEOARTHRITIS Half-sister     Cancer Half-brother     Diabetes Half-brother     Thyroid Disease Son             Review of Systems    Pain Assessment  5/13/2022   Location of Pain Neck; Shoulder;Hand   Location Modifiers Right   Severity of Pain 3   Quality of Pain Dull; Sharp   Frequency of Pain Intermittent   Relieving Factors NSAID   Result of Injury -           Vitals:  Ht 5' 6\" (1.676 m)   Wt 160 lb (72.6 kg)   BMI 25.82 kg/m²    Estimated body surface area is 1.84 meters squared as calculated from the following:    Height as of this encounter: 5' 6\" (1.676 m). Weight as of this encounter: 160 lb (72.6 kg). Body mass index is 25.82 kg/m². Physical Exam    Musculoskeletal Exam:    Left  Hand Evaluation    Patient has tenderness to palpation over the A1 pulley of the IF. Patient has catching, locking on examination. No other areas of tenderness to palpation are found. Patient fires AIN, PIN and ulnar nerves. Sensation is grossly intact in the median, radial and ulnar distribution. Hand is pink and appears well-perfused. Hand is warm. Skin is intact. Compartments are soft and compressible.       Consitutional: Healthy  Skin:   - Edema - none  - Cellulitis - No    Neuro: Numbness or tingling in R/L arm: No    Psych: Affect normal    Cardiovascular: Capillary Refill < 2 seconds in upper extremities    Respiratory: Non-Labored Breathing    ROS:    Constitutional: Denies fever/chills    Respiratory: Denies SOB        Imaging:    XR Results (most recent):  Results from Appointment encounter on 03/21/23    XR 2ND FINGER LT MIN 2 V    Narrative  Left Finger Xray  Indication: pain  Views: 3 views, AP/LAT/OBL    Interpretation: 3 views of the left index finger are reviewed and shows no evidence of fracture, subluxation or dislocation of the metacarpal phalangeal joint, proximal interphalangeal joint, or distal interphalangeal joints. There is no evidence of soft tissue swelling in the bone architecture appears normal.  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 There is osteophyte formation. Minimal joint space narrowing at the PIP joint. None at the MP. Orders Placed This Encounter    XR 2ND FINGER LT MIN 2 V     Standing Status:   Future     Number of Occurrences:   1     Standing Expiration Date:   3/21/2024          An electronic signature was used to authenticate this note.   -- Jovani Packer MD

## 2023-03-30 ENCOUNTER — TELEPHONE (OUTPATIENT)
Dept: ORTHOPEDIC SURGERY | Age: 73
End: 2023-03-30

## 2023-03-30 ENCOUNTER — VIRTUAL VISIT (OUTPATIENT)
Dept: ORTHOPEDIC SURGERY | Age: 73
End: 2023-03-30

## 2023-03-30 DIAGNOSIS — M54.12 CERVICAL RADICULOPATHY: ICD-10-CM

## 2023-03-30 DIAGNOSIS — M48.02 CERVICAL STENOSIS OF SPINE: Primary | ICD-10-CM

## 2023-03-30 DIAGNOSIS — Z98.1 S/P CERVICAL SPINAL FUSION: ICD-10-CM

## 2023-03-30 NOTE — PROGRESS NOTES
Roxene Pallas is a 74 y.o. male who was seen by synchronous (real-time) audio-video technology on 3/30/2023 for Neck Pain        Assessment & Plan:   Below is the assessment and plan developed based on review of pertinent history, physical exam, labs, studies, and medications. 1. Cervical stenosis of spine  -     REFERRAL TO SPINE INJECTION; Future  2. S/P cervical spinal fusion  -     REFERRAL TO SPINE INJECTION; Future  3. Cervical radiculopathy  -     REFERRAL TO SPINE INJECTION; Future      The patient's radiologic findings have been reviewed with him in detail today. He has been doing well until approximately 2 months ago when he began with sudden onset of right upper extremity numbness. He does have severe right foraminal narrowing at C3-4. He is not having radicular pain at this time only numbness. We have discussed various treatment options, and I would like for him to obtain a single right-sided C3-4 epidural steroid injection. He is amenable to this plan. He wonders if further surgical intervention is necessary. I would like for this injection to be a diagnostic aid in potential for surgical intervention. He does have residual foraminal and central stenosis in his previous surgical site. This injection may assist in determining further surgical need for the patient. He would like to return for a follow-up visit with Dr. Enrike Royal after his injection is complete. He may use over-the-counter Tylenol as needed for any pain. Return for Injection follow up, With Dr. Enrike Royal. I spent at least 15 minutes on this visit with this established patient. [id number]  Subjective:   Roxene Pallas (: 1950) is a 74 y.o. male who presents today for the following:  Chief Complaint   Patient presents with    Neck Pain        Neck Pain     Mr. Floyd Lyn returns today for a follow-up of his recent CT scan. His history as noted below. He is approximately 9 months out from his recent cervical fusion.   He states that he has done well until approximately 2 months ago when he began with a sudden onset of right upper extremity and right hand numbness that has been intermittent in nature. He states that the frequency has increased over the past 2 months. He has mild hand weakness. No arm pain. No significant neck discomfort. He denies any difficulty with balance, falls, or dropping objects. No left arm symptoms. IMAGING:  XR Results (most recent):   AP and lateral films of the cervical spine reveal stable cervical fusion from C4-C7 with stable instrumentation. Mild to moderate junctional stenosis above the fusion with minimal anterolisthesis at C3-4 and C4-5. CT Results (most recent):   EXAM:  CT CERVICAL SPINE WITHOUT CONTRAST  History:  Evaluate arthrodesis  INDICATION: . Arthrodesis status   COMPARISON: 6/20/2020     CONTRAST:  None. TECHNIQUE: Multislice helical CT of the cervical spine was performed without  intravenous contrast administration. Sagittal and coronal reformats were  generated. CT dose reduction was achieved through use of a standardized  protocol tailored for this examination and automatic exposure control for dose  modulation. FINDINGS:     There is anterolisthesis of C3 on C4. There is no fracture or compression  deformity. The odontoid process is intact. ACDF at C4-5, C5-6 and C6-7. Orthopedic hardware is intact. Intervertebral disc spacers at C4-5, C5-6 and  C6-7. The incidentally imaged soft tissues are within normal limits. C2-C3: Trace anterolisthesis. Facet arthropathy and hypertrophy is prominent on  the left. Minimal canal stenosis. Severe left and mild right foraminal  stenosis. .     C3-C4: Arthropathy and hypertrophy is severe on the right. Uncovertebral  degenerative change on the right. The canal is patent. There are severe right  and mild left foraminal stenosis. Luanne Freud C4-C5: Orthopedic hardware is intact. Left paracentral protrusion/osteophyte.   Severe canal and left foraminal stenosis. Jazmin Critchley C5-C6: Orthopedic hardware is intact. Lobulated protrusion/osteophyte. Severe  canal and bilateral foraminal stenosis. Jazmin Critchley C6-C7: Orthopedic hardware is intact. Circumferential bulge/osteophyte. Severe  canal and bilateral foraminal stenosis. .     C7-T1: There is no spinal canal or neural foraminal stenosis. IMPRESSION  Multilevel degenerative change. Severe canal left foraminal stenosis at C4-5. Severe canal and bilateral foraminal stenosis at C5-6 and C6-7. Severe left foraminal stenosis at C2-3 and severe right foraminal stenosis at  C3-4. Orthopedic hardware is intact. Allergies   Allergen Reactions    Morphine Nausea and Vomiting       Current Outpatient Medications   Medication Sig    cholecalciferol (VITAMIN D3) 25 mcg (1,000 unit) cap Take 1,000 Units by mouth every morning. fish oil-omega-3 fatty acids 340-1,000 mg capsule Take  by mouth daily. Takes 2, 400 mg    lysine (L-LYSINE) 500 mg tab tablet Take 500 mg by mouth every morning. ascorbic acid, vitamin C, (VITAMIN C) 500 mg tablet Take 500 mg by mouth daily. lisinopriL (PRINIVIL, ZESTRIL) 2.5 mg tablet Take 2.5 mg by mouth every morning. pantoprazole (PROTONIX) 20 mg tablet Take 20 mg by mouth every morning. aspirin delayed-release 81 mg tablet aspirin 81 mg tablet,delayed release   Take 1 tablet every day by oral route. atorvastatin (LIPITOR) 80 mg tablet atorvastatin 80 mg tablet   TAKE 1 TABLET EVERY DAY    levothyroxine (SYNTHROID) 112 mcg tablet levothyroxine 112 mcg tablet   TAKE 1 TABLET EVERY DAY    rivaroxaban (XARELTO) 2.5 mg tablet Xarelto 2.5 mg tablet   TAKE 1 TABLET BY MOUTH TWICE DAILY    naloxone (Narcan) 4 mg/actuation nasal spray Use 1 spray intranasally, then discard. Repeat with new spray every 2 min as needed for opioid overdose symptoms, alternating nostrils.  (Patient not taking: Reported on 3/30/2023)     No current facility-administered medications for this visit. Review of Systems   Constitutional: Negative. Respiratory: Negative. Cardiovascular: Negative. Gastrointestinal: Negative. Endocrine: Negative. Genitourinary: Negative. Musculoskeletal:  Positive for neck pain. Skin: Negative. Allergic/Immunologic: Negative. Neurological:  Positive for numbness. Hematological: Negative. Psychiatric/Behavioral: Negative. All other systems reviewed and are negative. Pain Assessment  5/13/2022   Location of Pain Neck; Shoulder;Hand   Location Modifiers Right   Severity of Pain 3   Quality of Pain Dull; Sharp   Frequency of Pain Intermittent   Relieving Factors NSAID   Result of Injury -       Objective:     Patient-Reported Vitals 3/30/2023   Patient-Reported Weight 166lb      General: alert, cooperative, no distress   Mental  status: normal mood, behavior, speech, dress, motor activity, and thought processes, able to follow commands   HENT: NCAT   Neck: no visualized mass   Resp: no respiratory distress   Neuro: no gross deficits   Skin: no discoloration or lesions of concern on visible areas   Psychiatric: normal affect, consistent with stated mood, no evidence of hallucinations     Additional exam findings: We discussed the expected course, resolution and complications of the diagnosis(es) in detail. Medication risks, benefits, costs, interactions, and alternatives were discussed as indicated. I advised him to contact the office if his condition worsens, changes or fails to improve as anticipated. He expressed understanding with the diagnosis(es) and plan. Ezequiel Adame, was evaluated through a synchronous (real-time) audio-video encounter. The patient (or guardian if applicable) is aware that this is a billable service, which includes applicable co-pays. This Virtual Visit was conducted with patient's (and/or legal guardian's) consent.  The visit was conducted pursuant to the emergency declaration under the [facility] Act and the [address] waiver authority and the [employer] Act. Patient identification was verified, and a caregiver was present when appropriate. The patient was located at: Home: [address] [place] [id number]  The provider was located at: Facility (Appt Department): Gerald Herzog was available for immediate consult during this encounter. An electronic signature was used to authenticate this note.   --BRENTON Kwan

## 2023-03-30 NOTE — TELEPHONE ENCOUNTER
Left pt voicemail to call our office and schedule follow up appt w/ dr Daniel Kerns after [facility]               ----- Message from Sarthak Brown, [address] sent at 3/30/2023 12:24 PM EDT -----  Regarding: patient follow up visit  Please call to schedule a follow up visit with Dr. Drew Fabian for injection follow up (several weeks after [facility]) that was ordered today.  Thanks so much!    [employer]

## 2023-03-31 DIAGNOSIS — M65.322 TRIGGER FINGER, LEFT INDEX FINGER: Primary | ICD-10-CM

## 2023-04-26 ENCOUNTER — OFFICE VISIT (OUTPATIENT)
Dept: ORTHOPEDIC SURGERY | Age: 73
End: 2023-04-26
Payer: MEDICARE

## 2023-04-26 VITALS — BODY MASS INDEX: 25.82 KG/M2 | WEIGHT: 160 LBS

## 2023-04-26 DIAGNOSIS — M65.322 TRIGGER FINGER, LEFT INDEX FINGER: ICD-10-CM

## 2023-04-26 DIAGNOSIS — Z98.890 STATUS POST SURGERY: Primary | ICD-10-CM

## 2023-04-26 PROCEDURE — 99024 POSTOP FOLLOW-UP VISIT: CPT | Performed by: ORTHOPAEDIC SURGERY

## 2023-04-26 NOTE — PROGRESS NOTES
Rohan Rodriguez (: 1950) is a 74 y.o. male patient here for evaluation of the following chief complaint(s):  Surgical Follow-up (Left index finger )       ASSESSMENT/PLAN:  Below is the assessment and plan developed based on review of pertinent history, physical exam, labs, studies, and medications. 1. Status post surgery  2. Trigger finger, left index finger      Patient is 2 weeks after trigger finger release doing very well. Patient was very pleased and was even able to bake a cake since surgery. Follow-up in 2 months for final check if needed. Patient verbalized understanding and elected to proceed. All questions were answered to the patient's apparent satisfaction. SUBJECTIVE/OBJECTIVE:    Patient is here today for a postoperative visit. Date of Surgery: 23    Patient reports overall he is doing very well with minimal pain and improve range of motion. Allergies   Allergen Reactions    Morphine Nausea and Vomiting       Current Outpatient Medications   Medication Sig    naloxone (Narcan) 4 mg/actuation nasal spray Use 1 spray intranasally, then discard. Repeat with new spray every 2 min as needed for opioid overdose symptoms, alternating nostrils. (Patient not taking: Reported on 3/30/2023)    cholecalciferol (VITAMIN D3) 25 mcg (1,000 unit) cap Take 1,000 Units by mouth every morning. fish oil-omega-3 fatty acids 340-1,000 mg capsule Take  by mouth daily. Takes 2, 400 mg    lysine (L-LYSINE) 500 mg tab tablet Take 500 mg by mouth every morning. ascorbic acid, vitamin C, (VITAMIN C) 500 mg tablet Take 500 mg by mouth daily. lisinopriL (PRINIVIL, ZESTRIL) 2.5 mg tablet Take 2.5 mg by mouth every morning. pantoprazole (PROTONIX) 20 mg tablet Take 20 mg by mouth every morning. aspirin delayed-release 81 mg tablet aspirin 81 mg tablet,delayed release   Take 1 tablet every day by oral route.     atorvastatin (LIPITOR) 80 mg tablet atorvastatin 80 mg tablet TAKE 1 TABLET EVERY DAY    levothyroxine (SYNTHROID) 112 mcg tablet levothyroxine 112 mcg tablet   TAKE 1 TABLET EVERY DAY    rivaroxaban (XARELTO) 2.5 mg tablet Xarelto 2.5 mg tablet   TAKE 1 TABLET BY MOUTH TWICE DAILY     No current facility-administered medications for this visit.        Social History     Socioeconomic History    Marital status:      Spouse name: Not on file    Number of children: Not on file    Years of education: Not on file    Highest education level: Not on file   Occupational History    Not on file   Tobacco Use    Smoking status: Former     Years: 50.00     Types: Cigarettes     Quit date: 2017     Years since quittin.3    Smokeless tobacco: Never   Vaping Use    Vaping Use: Never used   Substance and Sexual Activity    Alcohol use: Yes     Comment: drink beer monthly    Drug use: Never    Sexual activity: Not Currently   Other Topics Concern    Not on file   Social History Narrative    Not on file     Social Determinants of Health     Financial Resource Strain: Not on file   Food Insecurity: Not on file   Transportation Needs: Not on file   Physical Activity: Not on file   Stress: Not on file   Social Connections: Not on file   Intimate Partner Violence: Not on file   Housing Stability: Not on file       Past Surgical History:   Procedure Laterality Date    HX COLONOSCOPY      HX HEENT  1986    thyroid lobectomy    HX HEENT      wisdom teeth removal    HX KNEE REPLACEMENT Right 2006    partial knee replacement    HX LUMBAR LAMINECTOMY  2006    HX ORTHOPAEDIC Right 2005    attached muscle    HX ORTHOPAEDIC Right     attached muscle    HX ORTHOPAEDIC Left     attached muscle    HX PACEMAKER  2018    ID CORONARY ARTERY BYP W/VEIN & ARTERY GRAFT 3 VEIN  2017    ID UNLISTED PROCEDURE VASCULAR SURGERY Right     stent inserted in leg  x5       Family History   Problem Relation Age of Onset    Stroke Mother     Alcohol abuse Father     Stroke Half-sister     Diabetes Half-sister     OSTEOARTHRITIS Half-sister     Cancer Half-brother     Diabetes Half-brother     Thyroid Disease Son             Review of Systems    Pain Assessment  5/13/2022   Location of Pain Neck; Shoulder;Hand   Location Modifiers Right   Severity of Pain 3   Quality of Pain Dull; Sharp   Frequency of Pain Intermittent   Relieving Factors NSAID   Result of Injury -           Vitals: Wt 160 lb (72.6 kg)   BMI 25.82 kg/m²    Estimated body surface area is 1.84 meters squared as calculated from the following:    Height as of 3/21/23: 5' 6\" (1.676 m). Weight as of this encounter: 160 lb (72.6 kg). Body mass index is 25.82 kg/m². Physical Exam    Musculoskeletal Exam:    Left Upper Extremity Exam:    Evaluation of the patient's postoperative site shows that the incision is intact and healing appropriately. There are no signs of infection, no redness, no warmth, no erythema. There is no purulent drainage noted. Patient fires AIN, PIN and ulnar nerves. Sensation is grossly intact in the median, radial and ulnar distribution. Hand is pink and appears well-perfused. Hand is warm. Consitutional: Healthy  Skin:   - Edema - mild  - Cellulitis - No    Neuro: Numbness or tingling in R/L arm: none    Psych: Affect normal    Cardiovascular: Capillary Refill < 2 seconds in upper extremities    Respiratory: Non-Labored Breathing      ROS:    Constitutional: Denies fever/chills    Respiratory: Denies SOB        Imaging:    XR Results (most recent):  Results from Appointment encounter on 03/21/23    XR 2ND FINGER LT MIN 2 V    Narrative  Left Finger Xray  Indication: pain  Views: 3 views, AP/LAT/OBL    Interpretation: 3 views of the left index finger are reviewed and shows no evidence of fracture, subluxation or dislocation of the metacarpal phalangeal joint, proximal interphalangeal joint, or distal interphalangeal joints.   There is no evidence of soft tissue swelling in the bone architecture appears normal.  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 There is osteophyte formation. Minimal joint space narrowing at the PIP joint. None at the MP. No orders of the defined types were placed in this encounter. Procedures:    Sutures removed today. An electronic signature was used to authenticate this note.   -- Shala Alonzo MD

## (undated) DEVICE — MASTISOL ADHESIVE LIQ 2/3ML

## (undated) DEVICE — FLOSEAL HEMOSTATIC MATRIX, 5ML: Brand: FLOSEAL HEMOSTATIC MATRIX

## (undated) DEVICE — SUTURE ABSRB L30CM 2-0 VLT SPRL PDS + STRATAFIX SXPP1B410

## (undated) DEVICE — DEVICE SKIN CLOSURE 4 MM INCISION

## (undated) DEVICE — SOLUTION IRRIG 1000ML 0.9% SOD CHL USP POUR PLAS BTL

## (undated) DEVICE — GLOVE SURG SZ 8 L12IN FNGR THK79MIL GRN LTX FREE

## (undated) DEVICE — TAPE,CLOTH/SILK,CURAD,3"X10YD,LF,40/CS: Brand: CURAD

## (undated) DEVICE — DRILL BIT 7080510 11 MM DRILL BIT S

## (undated) DEVICE — SYR LR LCK 1ML GRAD NSAF 30ML --

## (undated) DEVICE — DRAIN SURG FLAT W7MMXL20CM FULL PERF

## (undated) DEVICE — ANTERIOR CERVICAL-SMH: Brand: MEDLINE INDUSTRIES, INC.

## (undated) DEVICE — SUTURE VCRL 2-0 L27IN ABSRB UD CP-2 L26MM 1/2 CIR REV CUT J869H

## (undated) DEVICE — PREP SKN DURAPREP 26ML APPL --

## (undated) DEVICE — SUTURE STRATAFIX SPRL MCRYL + SZ 3-0 L24IN ABSRB UD PS-2 SXMP1B108

## (undated) DEVICE — C-ARM: Brand: UNBRANDED

## (undated) DEVICE — TOOL 14MH30 LEGEND 14CM 3MM: Brand: MIDAS REX ™

## (undated) DEVICE — TELFA NON-ADHERENT ABSORBENT DRESSING: Brand: TELFA

## (undated) DEVICE — RESERVOIR,SUCTION,100CC,SILICONE: Brand: MEDLINE

## (undated) DEVICE — BONE WAX WHITE: Brand: BONE WAX WHITE

## (undated) DEVICE — COVER,MAYO STAND,STERILE: Brand: MEDLINE

## (undated) DEVICE — SOLUTION IV 250ML 0.9% SOD CHL CLR INJ FLX BG CONT PRT CLSR

## (undated) DEVICE — HALTER TRACTION HD W/ TRI COTTON LINING FOAM LTX

## (undated) DEVICE — GLOVE SURG SZ 75 L12IN FNGR THK94MIL STD WHT LTX FREE

## (undated) DEVICE — BIPOLAR IRRIGATOR INTEGRATED TUBING AND BIPOLAR CORD SET, DISPOSABLE